# Patient Record
Sex: MALE | Race: WHITE | NOT HISPANIC OR LATINO | Employment: OTHER | URBAN - METROPOLITAN AREA
[De-identification: names, ages, dates, MRNs, and addresses within clinical notes are randomized per-mention and may not be internally consistent; named-entity substitution may affect disease eponyms.]

---

## 2017-06-08 ENCOUNTER — TELEPHONE (OUTPATIENT)
Dept: NEUROLOGY | Facility: CLINIC | Age: 57
End: 2017-06-08

## 2017-06-08 NOTE — TELEPHONE ENCOUNTER
PT WAS INFORMED VIA LETTER THAT GABAPENTIN IS BECOMING CONTROLLED AS OF 7/1/2017. PT WAS INFORMED THAT WE WILL NOT REFILL CONTROLLED MEDICATIONS OVER THE PHONE OR THROUGH PHARMACY REQUESTS ANYMORE. PT UNDERSTANDS THAT THEY MUST MAKE THEIR FOLLOW UP APPT IN ORDER TO CONTINUE TO RECEIVE THEIR CONTROLLED MEDICATION.

## 2021-12-08 ENCOUNTER — APPOINTMENT (OUTPATIENT)
Dept: CT IMAGING | Facility: HOSPITAL | Age: 61
End: 2021-12-08

## 2021-12-08 ENCOUNTER — APPOINTMENT (OUTPATIENT)
Dept: GENERAL RADIOLOGY | Facility: HOSPITAL | Age: 61
End: 2021-12-08

## 2021-12-08 ENCOUNTER — HOSPITAL ENCOUNTER (INPATIENT)
Facility: HOSPITAL | Age: 61
LOS: 2 days | Discharge: HOME | End: 2021-12-10
Attending: EMERGENCY MEDICINE | Admitting: INTERNAL MEDICINE

## 2021-12-08 DIAGNOSIS — Z74.09 IMPAIRED MOBILITY AND ADLS: ICD-10-CM

## 2021-12-08 DIAGNOSIS — A41.9 SEPSIS, DUE TO UNSPECIFIED ORGANISM, UNSPECIFIED WHETHER ACUTE ORGAN DYSFUNCTION PRESENT (HCC): ICD-10-CM

## 2021-12-08 DIAGNOSIS — R07.2 PRECORDIAL PAIN: ICD-10-CM

## 2021-12-08 DIAGNOSIS — Z78.9 IMPAIRED MOBILITY AND ADLS: ICD-10-CM

## 2021-12-08 DIAGNOSIS — R50.9 FEBRILE ILLNESS: Primary | ICD-10-CM

## 2021-12-08 PROBLEM — I25.10 CORONARY ARTERY DISEASE INVOLVING NATIVE CORONARY ARTERY: Status: ACTIVE | Noted: 2021-12-08

## 2021-12-08 PROBLEM — E78.2 MIXED HYPERLIPIDEMIA: Status: ACTIVE | Noted: 2021-12-08

## 2021-12-08 PROBLEM — K90.0 CELIAC DISEASE: Status: ACTIVE | Noted: 2021-12-08

## 2021-12-08 PROBLEM — M79.10 MYALGIA: Status: ACTIVE | Noted: 2021-12-08
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
## 2021-12-08 PROBLEM — S30.860A TICK BITE OF LOWER BACK: Status: ACTIVE | Noted: 2021-12-08

## 2021-12-08 PROBLEM — J41.0 SIMPLE CHRONIC BRONCHITIS (HCC): Status: ACTIVE | Noted: 2021-12-08

## 2021-12-08 PROBLEM — E83.110 HEREDITARY HEMOCHROMATOSIS (HCC): Status: ACTIVE | Noted: 2021-12-08

## 2021-12-08 PROBLEM — I10 PRIMARY HYPERTENSION: Status: ACTIVE | Noted: 2021-12-08

## 2021-12-08 LAB
ALBUMIN SERPL-MCNC: 4.4 G/DL (ref 3.5–5.2)
ALBUMIN/GLOB SERPL: 1.3 G/DL
ALP SERPL-CCNC: 84 U/L (ref 39–117)
ALT SERPL W P-5'-P-CCNC: 17 U/L (ref 1–41)
AMPHET+METHAMPHET UR QL: NEGATIVE
AMPHETAMINES UR QL: NEGATIVE
ANION GAP SERPL CALCULATED.3IONS-SCNC: 11 MMOL/L (ref 5–15)
AST SERPL-CCNC: 21 U/L (ref 1–40)
BARBITURATES UR QL SCN: NEGATIVE
BASOPHILS # BLD AUTO: 0.05 10*3/MM3 (ref 0–0.2)
BASOPHILS NFR BLD AUTO: 0.3 % (ref 0–1.5)
BENZODIAZ UR QL SCN: NEGATIVE
BILIRUB SERPL-MCNC: 2.3 MG/DL (ref 0–1.2)
BILIRUB UR QL STRIP: NEGATIVE
BUN SERPL-MCNC: 19 MG/DL (ref 6–20)
BUN/CREAT SERPL: 17.4 (ref 7–25)
BUPRENORPHINE SERPL-MCNC: NEGATIVE NG/ML
CALCIUM SPEC-SCNC: 9.3 MG/DL (ref 8.6–10.5)
CANNABINOIDS SERPL QL: NEGATIVE
CHLORIDE SERPL-SCNC: 97 MMOL/L (ref 98–107)
CHOLEST SERPL-MCNC: 123 MG/DL (ref 0–200)
CLARITY UR: CLEAR
CO2 SERPL-SCNC: 24 MMOL/L (ref 22–29)
COCAINE UR QL: NEGATIVE
COLOR UR: YELLOW
CREAT SERPL-MCNC: 1.09 MG/DL (ref 0.76–1.27)
CRP SERPL-MCNC: 0.73 MG/DL (ref 0–0.5)
D-LACTATE SERPL-SCNC: 1.7 MMOL/L (ref 0.5–2)
D-LACTATE SERPL-SCNC: 2.7 MMOL/L (ref 0.5–2)
DEPRECATED RDW RBC AUTO: 41.3 FL (ref 37–54)
EOSINOPHIL # BLD AUTO: 0.09 10*3/MM3 (ref 0–0.4)
EOSINOPHIL NFR BLD AUTO: 0.6 % (ref 0.3–6.2)
ERYTHROCYTE [DISTWIDTH] IN BLOOD BY AUTOMATED COUNT: 14.1 % (ref 12.3–15.4)
FLUAV SUBTYP SPEC NAA+PROBE: NOT DETECTED
FLUBV RNA ISLT QL NAA+PROBE: NOT DETECTED
GFR SERPL CREATININE-BSD FRML MDRD: 69 ML/MIN/1.73
GLOBULIN UR ELPH-MCNC: 3.4 GM/DL
GLUCOSE SERPL-MCNC: 177 MG/DL (ref 65–99)
GLUCOSE UR STRIP-MCNC: NEGATIVE MG/DL
HBA1C MFR BLD: 6.3 % (ref 4.8–5.6)
HCT VFR BLD AUTO: 41.7 % (ref 37.5–51)
HDLC SERPL-MCNC: 49 MG/DL (ref 40–60)
HGB BLD-MCNC: 14.6 G/DL (ref 13–17.7)
HGB UR QL STRIP.AUTO: NEGATIVE
HOLD SPECIMEN: NORMAL
IMM GRANULOCYTES # BLD AUTO: 0.06 10*3/MM3 (ref 0–0.05)
IMM GRANULOCYTES NFR BLD AUTO: 0.4 % (ref 0–0.5)
KETONES UR QL STRIP: NEGATIVE
LDLC SERPL CALC-MCNC: 53 MG/DL (ref 0–100)
LDLC/HDLC SERPL: 1.04 {RATIO}
LEUKOCYTE ESTERASE UR QL STRIP.AUTO: NEGATIVE
LIPASE SERPL-CCNC: 42 U/L (ref 13–60)
LYMPHOCYTES # BLD AUTO: 0.6 10*3/MM3 (ref 0.7–3.1)
LYMPHOCYTES NFR BLD AUTO: 3.8 % (ref 19.6–45.3)
MCH RBC QN AUTO: 28.5 PG (ref 26.6–33)
MCHC RBC AUTO-ENTMCNC: 35 G/DL (ref 31.5–35.7)
MCV RBC AUTO: 81.4 FL (ref 79–97)
METHADONE UR QL SCN: NEGATIVE
MONOCYTES # BLD AUTO: 0.52 10*3/MM3 (ref 0.1–0.9)
MONOCYTES NFR BLD AUTO: 3.3 % (ref 5–12)
NEUTROPHILS NFR BLD AUTO: 14.55 10*3/MM3 (ref 1.7–7)
NEUTROPHILS NFR BLD AUTO: 91.6 % (ref 42.7–76)
NITRITE UR QL STRIP: NEGATIVE
NRBC BLD AUTO-RTO: 0 /100 WBC (ref 0–0.2)
NT-PROBNP SERPL-MCNC: 31.7 PG/ML (ref 0–900)
OPIATES UR QL: NEGATIVE
OXYCODONE UR QL SCN: NEGATIVE
PCP UR QL SCN: NEGATIVE
PH UR STRIP.AUTO: 7.5 [PH] (ref 5–8)
PLATELET # BLD AUTO: 144 10*3/MM3 (ref 140–450)
PMV BLD AUTO: 10 FL (ref 6–12)
POTASSIUM SERPL-SCNC: 4.3 MMOL/L (ref 3.5–5.2)
PROCALCITONIN SERPL-MCNC: 0.43 NG/ML (ref 0–0.25)
PROPOXYPH UR QL: NEGATIVE
PROT SERPL-MCNC: 7.8 G/DL (ref 6–8.5)
PROT UR QL STRIP: NEGATIVE
QT INTERVAL: 336 MS
QT INTERVAL: 350 MS
QTC INTERVAL: 437 MS
QTC INTERVAL: 456 MS
RBC # BLD AUTO: 5.12 10*6/MM3 (ref 4.14–5.8)
SARS-COV-2 RNA PNL SPEC NAA+PROBE: NOT DETECTED
SODIUM SERPL-SCNC: 132 MMOL/L (ref 136–145)
SP GR UR STRIP: 1.04 (ref 1–1.03)
TRICYCLICS UR QL SCN: NEGATIVE
TRIGL SERPL-MCNC: 115 MG/DL (ref 0–150)
TROPONIN T SERPL-MCNC: <0.01 NG/ML (ref 0–0.03)
TROPONIN T SERPL-MCNC: <0.01 NG/ML (ref 0–0.03)
UROBILINOGEN UR QL STRIP: ABNORMAL
VLDLC SERPL-MCNC: 21 MG/DL (ref 5–40)
WBC NRBC COR # BLD: 15.87 10*3/MM3 (ref 3.4–10.8)
WHOLE BLOOD HOLD SPECIMEN: NORMAL
WHOLE BLOOD HOLD SPECIMEN: NORMAL

## 2021-12-08 PROCEDURE — 0 IOPAMIDOL PER 1 ML: Performed by: EMERGENCY MEDICINE

## 2021-12-08 PROCEDURE — 86666 EHRLICHIA ANTIBODY: CPT | Performed by: NURSE PRACTITIONER

## 2021-12-08 PROCEDURE — 93005 ELECTROCARDIOGRAM TRACING: CPT | Performed by: EMERGENCY MEDICINE

## 2021-12-08 PROCEDURE — 87040 BLOOD CULTURE FOR BACTERIA: CPT | Performed by: EMERGENCY MEDICINE

## 2021-12-08 PROCEDURE — 99285 EMERGENCY DEPT VISIT HI MDM: CPT

## 2021-12-08 PROCEDURE — 83605 ASSAY OF LACTIC ACID: CPT | Performed by: EMERGENCY MEDICINE

## 2021-12-08 PROCEDURE — 86757 RICKETTSIA ANTIBODY: CPT | Performed by: NURSE PRACTITIONER

## 2021-12-08 PROCEDURE — 81003 URINALYSIS AUTO W/O SCOPE: CPT | Performed by: EMERGENCY MEDICINE

## 2021-12-08 PROCEDURE — 80053 COMPREHEN METABOLIC PANEL: CPT | Performed by: EMERGENCY MEDICINE

## 2021-12-08 PROCEDURE — 80061 LIPID PANEL: CPT | Performed by: NURSE PRACTITIONER

## 2021-12-08 PROCEDURE — 83880 ASSAY OF NATRIURETIC PEPTIDE: CPT | Performed by: EMERGENCY MEDICINE

## 2021-12-08 PROCEDURE — 71275 CT ANGIOGRAPHY CHEST: CPT

## 2021-12-08 PROCEDURE — 25010000002 ONDANSETRON PER 1 MG: Performed by: NURSE PRACTITIONER

## 2021-12-08 PROCEDURE — 80306 DRUG TEST PRSMV INSTRMNT: CPT | Performed by: NURSE PRACTITIONER

## 2021-12-08 PROCEDURE — 25010000002 ONDANSETRON PER 1 MG: Performed by: EMERGENCY MEDICINE

## 2021-12-08 PROCEDURE — 25010000002 PIPERACILLIN SOD-TAZOBACTAM PER 1 G: Performed by: EMERGENCY MEDICINE

## 2021-12-08 PROCEDURE — 86618 LYME DISEASE ANTIBODY: CPT | Performed by: NURSE PRACTITIONER

## 2021-12-08 PROCEDURE — 84484 ASSAY OF TROPONIN QUANT: CPT | Performed by: EMERGENCY MEDICINE

## 2021-12-08 PROCEDURE — 74176 CT ABD & PELVIS W/O CONTRAST: CPT

## 2021-12-08 PROCEDURE — 86140 C-REACTIVE PROTEIN: CPT | Performed by: NURSE PRACTITIONER

## 2021-12-08 PROCEDURE — 25010000002 VANCOMYCIN 10 G RECONSTITUTED SOLUTION: Performed by: EMERGENCY MEDICINE

## 2021-12-08 PROCEDURE — 93005 ELECTROCARDIOGRAM TRACING: CPT

## 2021-12-08 PROCEDURE — 83036 HEMOGLOBIN GLYCOSYLATED A1C: CPT | Performed by: NURSE PRACTITIONER

## 2021-12-08 PROCEDURE — 71045 X-RAY EXAM CHEST 1 VIEW: CPT

## 2021-12-08 PROCEDURE — 85025 COMPLETE CBC W/AUTO DIFF WBC: CPT

## 2021-12-08 PROCEDURE — 99223 1ST HOSP IP/OBS HIGH 75: CPT | Performed by: HOSPITALIST

## 2021-12-08 PROCEDURE — 83690 ASSAY OF LIPASE: CPT | Performed by: EMERGENCY MEDICINE

## 2021-12-08 PROCEDURE — 84145 PROCALCITONIN (PCT): CPT | Performed by: EMERGENCY MEDICINE

## 2021-12-08 PROCEDURE — 87636 SARSCOV2 & INF A&B AMP PRB: CPT | Performed by: EMERGENCY MEDICINE

## 2021-12-08 PROCEDURE — 94799 UNLISTED PULMONARY SVC/PX: CPT

## 2021-12-08 PROCEDURE — 25010000002 HEPARIN (PORCINE) PER 1000 UNITS: Performed by: NURSE PRACTITIONER

## 2021-12-08 PROCEDURE — 86753 PROTOZOA ANTIBODY NOS: CPT | Performed by: NURSE PRACTITIONER

## 2021-12-08 RX ORDER — ACETAMINOPHEN 325 MG/1
650 TABLET ORAL EVERY 4 HOURS PRN
Status: DISCONTINUED | OUTPATIENT
Start: 2021-12-08 | End: 2021-12-10 | Stop reason: HOSPADM

## 2021-12-08 RX ORDER — RANOLAZINE 500 MG/1
500 TABLET, EXTENDED RELEASE ORAL 2 TIMES DAILY
COMMUNITY

## 2021-12-08 RX ORDER — ALBUTEROL SULFATE 90 UG/1
2 AEROSOL, METERED RESPIRATORY (INHALATION) EVERY 4 HOURS PRN
COMMUNITY

## 2021-12-08 RX ORDER — ASPIRIN 81 MG/1
81 TABLET, CHEWABLE ORAL DAILY
COMMUNITY

## 2021-12-08 RX ORDER — ALBUTEROL SULFATE 90 UG/1
2 AEROSOL, METERED RESPIRATORY (INHALATION) ONCE
Status: COMPLETED | OUTPATIENT
Start: 2021-12-08 | End: 2021-12-08

## 2021-12-08 RX ORDER — VANCOMYCIN HYDROCHLORIDE 1 G/200ML
1000 INJECTION, SOLUTION INTRAVENOUS EVERY 12 HOURS
Status: DISCONTINUED | OUTPATIENT
Start: 2021-12-09 | End: 2021-12-10

## 2021-12-08 RX ORDER — CHOLECALCIFEROL (VITAMIN D3) 125 MCG
5 CAPSULE ORAL NIGHTLY PRN
Status: DISCONTINUED | OUTPATIENT
Start: 2021-12-08 | End: 2021-12-10 | Stop reason: HOSPADM

## 2021-12-08 RX ORDER — DOXYCYCLINE 100 MG/1
100 CAPSULE ORAL EVERY 12 HOURS SCHEDULED
Status: DISCONTINUED | OUTPATIENT
Start: 2021-12-08 | End: 2021-12-10 | Stop reason: HOSPADM

## 2021-12-08 RX ORDER — SODIUM CHLORIDE 0.9 % (FLUSH) 0.9 %
10 SYRINGE (ML) INJECTION EVERY 12 HOURS SCHEDULED
Status: DISCONTINUED | OUTPATIENT
Start: 2021-12-08 | End: 2021-12-10 | Stop reason: HOSPADM

## 2021-12-08 RX ORDER — ACETAMINOPHEN 160 MG/5ML
650 SOLUTION ORAL EVERY 4 HOURS PRN
Status: DISCONTINUED | OUTPATIENT
Start: 2021-12-08 | End: 2021-12-10 | Stop reason: HOSPADM

## 2021-12-08 RX ORDER — SODIUM CHLORIDE 9 MG/ML
100 INJECTION, SOLUTION INTRAVENOUS CONTINUOUS
Status: ACTIVE | OUTPATIENT
Start: 2021-12-08 | End: 2021-12-09

## 2021-12-08 RX ORDER — PANTOPRAZOLE SODIUM 40 MG/1
40 TABLET, DELAYED RELEASE ORAL
Status: DISCONTINUED | OUTPATIENT
Start: 2021-12-09 | End: 2021-12-10 | Stop reason: HOSPADM

## 2021-12-08 RX ORDER — HEPARIN SODIUM 5000 [USP'U]/ML
5000 INJECTION, SOLUTION INTRAVENOUS; SUBCUTANEOUS EVERY 8 HOURS SCHEDULED
Status: DISCONTINUED | OUTPATIENT
Start: 2021-12-08 | End: 2021-12-10 | Stop reason: HOSPADM

## 2021-12-08 RX ORDER — IPRATROPIUM BROMIDE AND ALBUTEROL SULFATE 2.5; .5 MG/3ML; MG/3ML
3 SOLUTION RESPIRATORY (INHALATION) EVERY 4 HOURS PRN
Status: DISCONTINUED | OUTPATIENT
Start: 2021-12-08 | End: 2021-12-10 | Stop reason: HOSPADM

## 2021-12-08 RX ORDER — AMOXICILLIN 250 MG
2 CAPSULE ORAL 2 TIMES DAILY
Status: DISCONTINUED | OUTPATIENT
Start: 2021-12-08 | End: 2021-12-10 | Stop reason: HOSPADM

## 2021-12-08 RX ORDER — POLYETHYLENE GLYCOL 3350 17 G/17G
17 POWDER, FOR SOLUTION ORAL DAILY PRN
Status: DISCONTINUED | OUTPATIENT
Start: 2021-12-08 | End: 2021-12-10 | Stop reason: HOSPADM

## 2021-12-08 RX ORDER — SODIUM CHLORIDE 0.9 % (FLUSH) 0.9 %
10 SYRINGE (ML) INJECTION AS NEEDED
Status: DISCONTINUED | OUTPATIENT
Start: 2021-12-08 | End: 2021-12-10 | Stop reason: HOSPADM

## 2021-12-08 RX ORDER — CLOPIDOGREL BISULFATE 75 MG/1
75 TABLET ORAL DAILY
COMMUNITY

## 2021-12-08 RX ORDER — BISACODYL 5 MG/1
5 TABLET, DELAYED RELEASE ORAL DAILY PRN
Status: DISCONTINUED | OUTPATIENT
Start: 2021-12-08 | End: 2021-12-10 | Stop reason: HOSPADM

## 2021-12-08 RX ORDER — LISINOPRIL 5 MG/1
5 TABLET ORAL
Status: DISCONTINUED | OUTPATIENT
Start: 2021-12-09 | End: 2021-12-10 | Stop reason: HOSPADM

## 2021-12-08 RX ORDER — ONDANSETRON 2 MG/ML
4 INJECTION INTRAMUSCULAR; INTRAVENOUS EVERY 6 HOURS PRN
Status: DISCONTINUED | OUTPATIENT
Start: 2021-12-08 | End: 2021-12-10 | Stop reason: HOSPADM

## 2021-12-08 RX ORDER — ACETAMINOPHEN 650 MG/1
650 SUPPOSITORY RECTAL EVERY 4 HOURS PRN
Status: DISCONTINUED | OUTPATIENT
Start: 2021-12-08 | End: 2021-12-10 | Stop reason: HOSPADM

## 2021-12-08 RX ORDER — ROSUVASTATIN CALCIUM 10 MG/1
40 TABLET, COATED ORAL DAILY
COMMUNITY

## 2021-12-08 RX ORDER — BISACODYL 10 MG
10 SUPPOSITORY, RECTAL RECTAL DAILY PRN
Status: DISCONTINUED | OUTPATIENT
Start: 2021-12-08 | End: 2021-12-10 | Stop reason: HOSPADM

## 2021-12-08 RX ORDER — ONDANSETRON 2 MG/ML
4 INJECTION INTRAMUSCULAR; INTRAVENOUS ONCE
Status: COMPLETED | OUTPATIENT
Start: 2021-12-08 | End: 2021-12-08

## 2021-12-08 RX ADMIN — SODIUM CHLORIDE 100 ML/HR: 9 INJECTION, SOLUTION INTRAVENOUS at 23:50

## 2021-12-08 RX ADMIN — SODIUM CHLORIDE 1000 ML: 9 INJECTION, SOLUTION INTRAVENOUS at 14:41

## 2021-12-08 RX ADMIN — SODIUM CHLORIDE, PRESERVATIVE FREE 10 ML: 5 INJECTION INTRAVENOUS at 23:50

## 2021-12-08 RX ADMIN — TAZOBACTAM SODIUM AND PIPERACILLIN SODIUM 4.5 G: 500; 4 INJECTION, SOLUTION INTRAVENOUS at 17:07

## 2021-12-08 RX ADMIN — DOCUSATE SODIUM 50 MG AND SENNOSIDES 8.6 MG 2 TABLET: 8.6; 5 TABLET, FILM COATED ORAL at 23:49

## 2021-12-08 RX ADMIN — ONDANSETRON 4 MG: 2 INJECTION INTRAMUSCULAR; INTRAVENOUS at 23:49

## 2021-12-08 RX ADMIN — IOPAMIDOL 80 ML: 755 INJECTION, SOLUTION INTRAVENOUS at 14:29

## 2021-12-08 RX ADMIN — Medication 5 MG: at 23:49

## 2021-12-08 RX ADMIN — ACETAMINOPHEN 650 MG: 325 TABLET, FILM COATED ORAL at 23:50

## 2021-12-08 RX ADMIN — DOXYCYCLINE 100 MG: 100 CAPSULE ORAL at 19:09

## 2021-12-08 RX ADMIN — ALBUTEROL SULFATE 2 PUFF: 90 AEROSOL, METERED RESPIRATORY (INHALATION) at 14:08

## 2021-12-08 RX ADMIN — HEPARIN SODIUM 5000 UNITS: 5000 INJECTION INTRAVENOUS; SUBCUTANEOUS at 23:49

## 2021-12-08 RX ADMIN — ONDANSETRON 4 MG: 2 INJECTION INTRAMUSCULAR; INTRAVENOUS at 14:41

## 2021-12-08 RX ADMIN — SODIUM CHLORIDE 1000 ML: 9 INJECTION, SOLUTION INTRAVENOUS at 16:57

## 2021-12-08 RX ADMIN — VANCOMYCIN HYDROCHLORIDE 2000 MG: 10 INJECTION, POWDER, LYOPHILIZED, FOR SOLUTION INTRAVENOUS at 17:43

## 2021-12-08 NOTE — H&P
[facility] Medicine Services  HISTORY AND PHYSICAL    Patient Name: Kobi Gonzalez  : 1960  MRN: [id number]  Primary Care Physician: Leila Kolb, OXANA  Date of admission: 2021      Subjective   Subjective     Chief Complaint:  Fever, arthralgia, chest pain, shortness of breath    HPI:  Kobi Gonzalez is a 60 y.o. male PMH MI with 1 stent (), COPD, hx of smoking (quit ), HTN, GERD, chronic low back pain, celiac disease, hemochromatosis, HLD presenting with fever, arthralgia, chest pain, shortness of breath that has been intermittent since . Pt reports tick bite in May but work up was negative for Lyme's disease. He reports pneumonia in July (received oral antibiotics without improvement) and COVID in August. Pt states he continues to have intermittent episodes of fever, headache, dizziness, nausea, vomiting,  abd pain, rash, diarrhea, joint pain with swelling, shortness of breath, coughing. Denies decreased appetite and he is unsure if he has had any weight loss. Pt follows with cardiologist Dr Gamaliel Centeno in [facility]. Pt had heart cath last week at [facility] that was negative. He reports colonoscopy 2 years ago that was normal.     Review of Systems   Constitutional: Positive for activity change, fatigue and fever.   Eyes: Negative.    Respiratory: Positive for cough and shortness of breath.    Cardiovascular: Positive for chest pain and leg swelling.   Gastrointestinal: Positive for abdominal pain, diarrhea, nausea and vomiting.   Endocrine: Negative.    Genitourinary: Positive for flank pain. Negative for dysuria.   Musculoskeletal: Positive for arthralgias, back pain, joint swelling and myalgias. Negative for neck pain and neck stiffness.   Skin: Positive for rash.   Allergic/Immunologic: Negative.    Neurological: Positive for dizziness, weakness and headaches.   Hematological: Negative.    Psychiatric/Behavioral: Negative.       All other systems reviewed and are negative.     Personal History     Past Medical History:   Diagnosis Date   • Celiac disease    • GERD (gastroesophageal reflux disease)    • Hemochromatosis    • Hyperlipidemia    • Hypertension    • MI (myocardial infarction) (HCC) 2018    1 STENT       Past Surgical History:   Procedure Laterality Date   • NO PAST SURGERIES         Family History: family history includes Heart disease in his father and mother; Hypertension in his father and mother. Otherwise pertinent FHx was reviewed and unremarkable.     Social History:  reports that he quit smoking about 17 years ago. He does not have any smokeless tobacco history on file. He reports that he does not drink alcohol and does not use drugs.  Social History     Social History Narrative   • Not on file       Medications:    Available home medication information reviewed.  (Not in a hospital admission)      No Known Allergies    Objective   Objective     Vital Signs:   Temp:  [101.1 °F (38.4 °C)] 101.1 °F (38.4 °C)  Heart Rate:  [] 97  Resp:  [20] 20  BP: (128-146)/(76-88) 131/78        Physical Exam  Constitutional:       Appearance: He is obese. He is ill-appearing. He is not toxic-appearing.   HENT:      Head: Normocephalic and atraumatic.      Nose: Nose normal.   Eyes:      Conjunctiva/sclera: Conjunctivae normal.      Pupils: Pupils are equal, round, and reactive to light.   Cardiovascular:      Rate and Rhythm: Regular rhythm. Tachycardia present.      Pulses: Normal pulses.      Heart sounds: Normal heart sounds.   Pulmonary:      Effort: Respiratory distress present.      Breath sounds: Rhonchi and rales present. No wheezing.      Comments: Tachypneic  Chest:      Chest wall: No tenderness.   Abdominal:      General: There is distension.      Tenderness: There is abdominal tenderness. There is no guarding or rebound.   Musculoskeletal:         General: Swelling present.      Cervical back: Normal range of motion  and neck supple. No rigidity.   Skin:     General: Skin is warm and dry.   Neurological:      General: No focal deficit present.      Mental Status: He is alert and oriented to person, place, and time.      Cranial Nerves: No cranial nerve deficit.      Motor: Weakness present.   Psychiatric:         Mood and Affect: Mood normal.         Behavior: Behavior normal.         Thought Content: Thought content normal.         Judgment: Judgment normal.        Results Reviewed:  I have personally reviewed current lab and radiology data.    Results from last 7 days   Lab Units 12/08/21  1318   WBC 10*3/mm3 15.87*   HEMOGLOBIN g/dL 14.6   HEMATOCRIT % 41.7   PLATELETS 10*3/mm3 144     Results from last 7 days   Lab Units 12/08/21  1627 12/08/21  1522 12/08/21  1318 12/08/21  1318   SODIUM mmol/L  --   --   --  132*   POTASSIUM mmol/L  --   --   --  4.3   CHLORIDE mmol/L  --   --   --  97*   CO2 mmol/L  --   --   --  24.0   BUN mg/dL  --   --   --  19   CREATININE mg/dL  --   --   --  1.09   GLUCOSE mg/dL  --   --   --  177*   CALCIUM mg/dL  --   --   --  9.3   ALT (SGPT) U/L  --   --   --  17   AST (SGOT) U/L  --   --   --  21   TROPONIN T ng/mL  --  <0.010  --  <0.010   PROBNP pg/mL  --   --   --  31.7   LACTATE mmol/L 1.7  --    < > 2.7*   PROCALCITONIN ng/mL  --   --   --  0.43*    < > = values in this interval not displayed.     Estimated Creatinine Clearance: 88.1 mL/min (by C-G formula based on SCr of 1.09 mg/dL).  Brief Urine Lab Results  (Last result in the past 365 days)      Color   Clarity   Blood   Leuk Est   Nitrite   Protein   CREAT   Urine HCG        12/08/21 1522 Yellow   Clear   Negative   Negative   Negative   Negative               Imaging Results (Last 24 Hours)     Procedure Component Value Units Date/Time    CT Abdomen Pelvis Without Contrast [[id number]] Resulted: 12/08/21 1802     Updated: 12/08/21 1806    CT Chest Pulmonary Embolism [[id number]] Collected: 12/08/21 1433     Updated: 12/08/21 1438     Narrative:      EXAMINATION: CT CHEST PULMONARY EMBOLISM-12/08/2021:      INDICATION: PE suspected, high prob, shortness of breath.     TECHNIQUE: Multiple axial CT imaging was obtained of the chest following  the administration of intravenous contrast.     The radiation dose reduction device was turned on for each scan per the  ALARA (As Low as Reasonably Achievable) protocol.     COMPARISON: NONE.     FINDINGS: The thyroid is homogeneous. No mediastinal mass or adenopathy.  The cardiac chambers are within normal limits. No pericardial effusion.  No bulky hilar or axillary lymphadenopathy. No filling defect in the  segmental or subsegmental arteries. Degenerative changes seen within the  spine. The visualized upper abdomen reveals some chronic changes and  scarring in the lung bases bilaterally. No superimposed infiltrate. No  pulmonary mass or nodule. Degenerative changes seen within the spine.       Impression:      No evidence of pulmonary embolism. No CT evidence of acute  intrathoracic abnormality. No superimposed infectious process.     D:  12/08/2021  E:  12/08/2021          XR Chest 1 View [[id number]] Collected: 12/08/21 1353     Updated: 12/08/21 1354    Narrative:      EXAMINATION: XR CHEST 1 VW-12/08/2021:      INDICATION: Chest Pain, triage protocol.      COMPARISON: NONE.     FINDINGS: Portable chest reveals cardiac and mediastinal silhouettes  within normal limits. The lung fields are clear. No focal parenchymal  opacification present.  No pleural effusion or pneumothorax. The bony  structures are unremarkable.       Impression:      No acute cardiopulmonary disease.     D:  12/08/2021  E:  12/08/2021                  Assessment/Plan   Assessment / Plan     Active Hospital Problems    Diagnosis  POA   • Sepsis, unspecified organism (HCC) [A41.9]  Unknown   • Tick bite of lower back [S30.860A, W57.XXXA]  Unknown   • Fever [R50.9]  Unknown   • Myalgia [M79.10]  Unknown   • Celiac disease [K90.0]   Unknown   • Hereditary hemochromatosis (HCC) [E83.110]  Unknown   • Coronary artery disease involving native coronary artery [I25.10]  Unknown   • Primary hypertension [I10]  Unknown   • Mixed hyperlipidemia [E78.2]  Unknown   • Simple chronic bronchitis (HCC) [J41.0]  Unknown     Hospital Course:  Kobi Gonzalez is a 60 y.o. male PMH MI with 1 stent (2018), COPD, hx of smoking (quit 2003), HTN, GERD, chronic low back pain, celiac disease, hemochromatosis, HLD presenting with fever, arthralgia, chest pain, shortness of breath that has been intermittent since June.     Sepsis unknown origin  -Fluid Resuscitation  -Lactic acid on arrival 2.7, repeat 1.7  -elevated procal 0.43, WBC 15.87  -blood cultures, GI PCR, respiratory PCR  -Tick borne panel   -zosyn, vanco, doxy  -MRSA PCR  -COVID/Influenza neg   -NS 100ml/hr overnight  -CTA chest No PE or acute abnormality   -Cxray no acute issues  -CTabd/pelvis pending    Chest pain(appears pleuritic)  Hx MI with 1 stent (2018)  -troponin neg   -BNP normal  -ECHO  -EKG Sinus Tach Qt/Qtc 350/456    Elevated Glucose  -A1c 6.3    Hyponatremia  -Na+ 132, corrected 133     HTN/HLD  -Lisinopril    Hyperbilirubinemia  -total bili 2.3  -?Gilbert's syndrome    Obesity  -BMI 33.00    Hx Hemochromatosis    Celiac's disease  -Celiac diet    GERD  -Protonix    Chronic low back pain  -gabapentin on home med list but juan report #[id number] show no controlled substances.     DVT prophylaxis:  Heparin     CODE STATUS:    Code Status and Medical Interventions:   Ordered at: 12/08/21 5640     Level Of Support Discussed With:    Patient     Code Status (Patient has no pulse and is not breathing):    CPR (Attempt to Resuscitate)     Medical Interventions (Patient has pulse or is breathing):    Full Support     Admission Status:  I believe this patient meets INPATIENT status due to Sepsis POA with unclear source.  I feel patient’s risk for adverse outcomes and need for care warrant INPATIENT  evaluation and I predict the patient’s care encounter to likely last beyond 2 midnights.    Electronically signed by OXANA Durant, 12/08/21, 5:19 PM EST.      Attending   Admission Attestation       I have seen and examined the patient, performing an independent face-to-face diagnostic evaluation with plan of care reviewed and developed with the advanced practice clinician (APC).      Brief Summary Statement:   Kobi Gonzalez is a 60 y.o. male history of GERD, hemochromatosis, hyperlipidemia, hypertension, celiac disease, coronary artery disease with 1 stent, COPD, former smoker, hypertension, low back pain who presented to the ER with shortness of breath, chest pain, fevers, cough, chest congestion.    He has had chest pains for several months maybe even going back to the summer 2021    Precordial pain has been there since July 2021.  He also appears to meet sepsis criteria present on admission with unclear source of infection.  Temperature of 101.1 CT of the chest was done in the ER.  His T-max today was 103.8.  Covid and influenza testing was negative.  CT of the abdomen pelvis was done due to the fact that we do not have a localizing source of infection.  Cholelithiasis on CT.  Elevated Bilirubin.   US Gallbladder pending    Remainder of detailed HPI is as noted by APC and has been reviewed and/or edited by me for completeness.    Attending Physical Exam:    Constitutional: Awake, alert  Eyes: PERRLA, sclerae anicteric, no conjunctival injection  HENT: NCAT, mucous membranes moist  Neck: Supple, no JVD, trachea midline  Respiratory: Clear to auscultation bilaterally, nonlabored respirations   Cardiovascular: RRR, no murmurs, rubs, or gallops, palpable pedal pulses bilaterally  Gastrointestinal: Positive bowel sounds, soft, nontender, obese abdomen  Musculoskeletal: No bilateral ankle edema, no clubbing or cyanosis to extremities  Psychiatric: Appropriate affect, cooperative  Neurologic: Oriented x 3,  strength symmetric in all extremities, Cranial Nerves grossly intact to confrontation, speech clear  Skin: dry, + skin, bronzed skin of suntan in face and neck    Brief Assessment/Plan :  See detailed assessment and plan developed with APC which I have reviewed and/or edited for completeness.    Admission Status: I believe that this patient meets INPATIENT status due to Sepsis POA with unclear source of infection.  I feel patient’s risk for adverse outcomes and need for care warrant INPATIENT evaluation and I predict the patient’s care encounter to likely last beyond 2 midnights.        Robb James MD  12/08/21

## 2021-12-08 NOTE — ED PROVIDER NOTES
Subjective   60-year-old male presents to the emergency department with cough congestion fever chest pain and shortness of breath    Patient reports a history of chest pain for the last 2 months.  He had a heart cath within the last week that was negative [facility].  They gave him a diagnosis however he cannot remember what that was.  His chest pain is continued and he reports pain that was 8 or 9/10 of substernal chest pain worse with movement and breathing.  He was exposed to coronavirus recently with onset of fever today.  He has had cough congestion and increased shortness of breath today as well.  His cough is productive of white thick sputum but just a small amount.  He does have pleuritic pain.  He denies any radiation of the pain and is primarily at the left inferior parasternal area and left anterior chest.  It is worse with movement.  He denies any lower extremity edema.  He has had no history of DVT.  He denies any leg redness    Denies any abdominal pain nausea vomiting diarrhea or melena but occasionally has some flecks of blood in his stool.  He denies any dysuria frequency urgency or hematuria.          Review of Systems   Constitutional: Positive for chills and fever.   HENT: Positive for congestion. Negative for sore throat.    Eyes: Negative.    Respiratory: Positive for cough and shortness of breath.    Cardiovascular: Positive for chest pain. Negative for palpitations and leg swelling.   Gastrointestinal: Negative.  Negative for abdominal pain, diarrhea, nausea and vomiting.   Genitourinary: Negative.  Negative for dysuria.   Skin: Negative.    Neurological: Negative.    All other systems reviewed and are negative.      Past Medical History:   Diagnosis Date   • Celiac disease    • GERD (gastroesophageal reflux disease)    • Hemochromatosis    • Hyperlipidemia    • Hypertension        No Known Allergies    Past Surgical History:   Procedure Laterality Date   • NO PAST SURGERIES         Family  History   Problem Relation Age of Onset   • Heart disease Mother    • Hypertension Mother    • Heart disease Father    • Hypertension Father        Social History     Socioeconomic History   • Marital status:    Tobacco Use   • Smoking status: Never Smoker   Substance and Sexual Activity   • Alcohol use: No   • Drug use: No   • Sexual activity: Defer           Objective   Physical Exam  Vitals and nursing note reviewed.   Constitutional:       General: He is not in acute distress.     Appearance: He is ill-appearing.   HENT:      Head: Normocephalic and atraumatic.      Nose: Nose normal.   Eyes:      Conjunctiva/sclera: Conjunctivae normal.   Cardiovascular:      Rate and Rhythm: Normal rate and regular rhythm.      Heart sounds: Normal heart sounds.      Comments: Chest wall of left parasternal tenderness at the inferior aspect that is exquisite and reproduces his symptoms  Pulmonary:      Effort: Tachypnea and respiratory distress present.      Breath sounds: Examination of the right-middle field reveals rales. Examination of the left-middle field reveals rales. Examination of the right-lower field reveals rales. Examination of the left-lower field reveals rales. Decreased breath sounds, rhonchi and rales present. No wheezing.   Chest:      Chest wall: No tenderness.   Abdominal:      General: Bowel sounds are normal. There is no distension.      Palpations: Abdomen is soft.      Tenderness: There is no abdominal tenderness.   Musculoskeletal:         General: Normal range of motion.      Cervical back: Normal range of motion.      Right lower leg: No tenderness. No edema.      Left lower leg: No tenderness. No edema.      Comments: No stigmata of DVT   Skin:     General: Skin is warm and dry.   Neurological:      General: No focal deficit present.      Mental Status: He is alert and oriented to person, place, and time.         Procedures           ED Course  ED Course as of 12/08/21 1654   Wed Dec 08,  2021 1340 Patient presents with an O2 sat of 92%.  He is on oxygen with improvement of his saturation.  Temp is 101.1.  He has Covid exposure but is fully immunized against Covid and had Covid in August.  Reswabbing him again.  I discussed evaluation.  We will CT his chest as well.  Patient is in agreement [HH]   1456 Patient is serially reevaluated throughout the ED course with last reevaluation now.  His pressures remained adequate.  His chest pain is improved.  His T-max was 103.8 today.  His lactate is 2.7 with white count of 15.8 and procalcitonin of 0.43.  Covid influenza testing are negative.The patient's received a liter fluid with a second liter pending.  I treated him with Zosyn based on his sepsis parameters but I have no clear etiology of the symptoms.In further discussion he reports recurrent intermittent fever since June, but again reports that yesterday he was completely asymptomatic [HH]   1652 Discussed the findings with Dr. James who will admit for definitive inpatient careI reevaluated the patient and he was complaining of left flank pain.  He has a clinical picture of sepsis without a source.  He is treated with Zosyn and ordered vancomycin as well.  We will proceed with a CT as well for localization of the source [HH]      ED Course User Index  [] Jonh Martinez MD                                                 [facility]    Final diagnoses:   Febrile illness   Sepsis, due to unspecified organism, unspecified whether acute organ dysfunction present (HCC)   Precordial pain       ED Disposition  ED Disposition     ED Disposition Condition Comment    Decision to Admit            No follow-up provider specified.       Medication List      No changes were made to your prescriptions during this visit.          Jonh Martinez MD  12/08/21 2226

## 2021-12-09 ENCOUNTER — APPOINTMENT (OUTPATIENT)
Dept: ULTRASOUND IMAGING | Facility: HOSPITAL | Age: 61
End: 2021-12-09

## 2021-12-09 ENCOUNTER — APPOINTMENT (OUTPATIENT)
Dept: CARDIOLOGY | Facility: HOSPITAL | Age: 61
End: 2021-12-09

## 2021-12-09 LAB
ADV 40+41 DNA STL QL NAA+NON-PROBE: NOT DETECTED
ALBUMIN SERPL-MCNC: 3.4 G/DL (ref 3.5–5.2)
ALBUMIN/GLOB SERPL: 1.3 G/DL
ALP SERPL-CCNC: 67 U/L (ref 39–117)
ALT SERPL W P-5'-P-CCNC: 12 U/L (ref 1–41)
ANION GAP SERPL CALCULATED.3IONS-SCNC: 5 MMOL/L (ref 5–15)
AST SERPL-CCNC: 14 U/L (ref 1–40)
ASTRO TYP 1-8 RNA STL QL NAA+NON-PROBE: NOT DETECTED
B PARAPERT DNA SPEC QL NAA+PROBE: NOT DETECTED
B PERT DNA SPEC QL NAA+PROBE: NOT DETECTED
BASOPHILS # BLD AUTO: 0.04 10*3/MM3 (ref 0–0.2)
BASOPHILS NFR BLD AUTO: 0.4 % (ref 0–1.5)
BH CV ECHO MEAS - AO MAX PG (FULL): 2.5 MMHG
BH CV ECHO MEAS - AO MAX PG: 6.5 MMHG
BH CV ECHO MEAS - AO MEAN PG (FULL): 1.6 MMHG
BH CV ECHO MEAS - AO MEAN PG: 3.5 MMHG
BH CV ECHO MEAS - AO ROOT AREA (BSA CORRECTED): 1.7
BH CV ECHO MEAS - AO ROOT AREA: 10.9 CM^2
BH CV ECHO MEAS - AO ROOT DIAM: 3.7 CM
BH CV ECHO MEAS - AO V2 MAX: 127.6 CM/SEC
BH CV ECHO MEAS - AO V2 MEAN: 87.1 CM/SEC
BH CV ECHO MEAS - AO V2 VTI: 27 CM
BH CV ECHO MEAS - ASC AORTA: 3.8 CM
BH CV ECHO MEAS - AVA(I,A): 2.5 CM^2
BH CV ECHO MEAS - AVA(I,D): 2.5 CM^2
BH CV ECHO MEAS - AVA(V,A): 2.5 CM^2
BH CV ECHO MEAS - AVA(V,D): 2.5 CM^2
BH CV ECHO MEAS - BSA(HAYCOCK): 2.3 M^2
BH CV ECHO MEAS - BSA: 2.2 M^2
BH CV ECHO MEAS - BZI_BMI: 34.1 KILOGRAMS/M^2
BH CV ECHO MEAS - BZI_METRIC_HEIGHT: 177.8 CM
BH CV ECHO MEAS - BZI_METRIC_WEIGHT: 108 KG
BH CV ECHO MEAS - EDV(CUBED): 198.2 ML
BH CV ECHO MEAS - EDV(MOD-SP2): 121 ML
BH CV ECHO MEAS - EDV(MOD-SP4): 125 ML
BH CV ECHO MEAS - EDV(TEICH): 168.6 ML
BH CV ECHO MEAS - EF(CUBED): 77.6 %
BH CV ECHO MEAS - EF(MOD-BP): 72 %
BH CV ECHO MEAS - EF(MOD-SP2): 72.7 %
BH CV ECHO MEAS - EF(MOD-SP4): 72 %
BH CV ECHO MEAS - EF(TEICH): 69 %
BH CV ECHO MEAS - ESV(CUBED): 44.4 ML
BH CV ECHO MEAS - ESV(MOD-SP2): 33 ML
BH CV ECHO MEAS - ESV(MOD-SP4): 35 ML
BH CV ECHO MEAS - ESV(TEICH): 52.3 ML
BH CV ECHO MEAS - FS: 39.3 %
BH CV ECHO MEAS - IVS/LVPW: 0.95
BH CV ECHO MEAS - IVSD: 0.95 CM
BH CV ECHO MEAS - LA DIMENSION: 3.9 CM
BH CV ECHO MEAS - LA/AO: 1.1
BH CV ECHO MEAS - LAD MAJOR: 5.8 CM
BH CV ECHO MEAS - LAT PEAK E' VEL: 12 CM/SEC
BH CV ECHO MEAS - LATERAL E/E' RATIO: 6.4
BH CV ECHO MEAS - LV DIASTOLIC VOL/BSA (35-75): 55.6 ML/M^2
BH CV ECHO MEAS - LV IVRT: 0.07 SEC
BH CV ECHO MEAS - LV MASS(C)D: 226.7 GRAMS
BH CV ECHO MEAS - LV MASS(C)DI: 100.9 GRAMS/M^2
BH CV ECHO MEAS - LV MAX PG: 4 MMHG
BH CV ECHO MEAS - LV MEAN PG: 1.9 MMHG
BH CV ECHO MEAS - LV SYSTOLIC VOL/BSA (12-30): 15.6 ML/M^2
BH CV ECHO MEAS - LV V1 MAX: 100.2 CM/SEC
BH CV ECHO MEAS - LV V1 MEAN: 64 CM/SEC
BH CV ECHO MEAS - LV V1 VTI: 20.9 CM
BH CV ECHO MEAS - LVIDD: 5.8 CM
BH CV ECHO MEAS - LVIDS: 3.5 CM
BH CV ECHO MEAS - LVLD AP2: 8.5 CM
BH CV ECHO MEAS - LVLD AP4: 8.4 CM
BH CV ECHO MEAS - LVLS AP2: 7.5 CM
BH CV ECHO MEAS - LVLS AP4: 7.5 CM
BH CV ECHO MEAS - LVOT AREA (M): 3.1 CM^2
BH CV ECHO MEAS - LVOT AREA: 3.2 CM^2
BH CV ECHO MEAS - LVOT DIAM: 2 CM
BH CV ECHO MEAS - LVPWD: 1 CM
BH CV ECHO MEAS - MED PEAK E' VEL: 6.9 CM/SEC
BH CV ECHO MEAS - MEDIAL E/E' RATIO: 11.1
BH CV ECHO MEAS - MV A MAX VEL: 55.8 CM/SEC
BH CV ECHO MEAS - MV DEC SLOPE: 348.8 CM/SEC^2
BH CV ECHO MEAS - MV DEC TIME: 0.23 SEC
BH CV ECHO MEAS - MV E MAX VEL: 78.5 CM/SEC
BH CV ECHO MEAS - MV E/A: 1.4
BH CV ECHO MEAS - MV P1/2T MAX VEL: 90.9 CM/SEC
BH CV ECHO MEAS - MV P1/2T: 76.4 MSEC
BH CV ECHO MEAS - MVA P1/2T LCG: 2.4 CM^2
BH CV ECHO MEAS - MVA(P1/2T): 2.9 CM^2
BH CV ECHO MEAS - PA ACC SLOPE: 547.6 CM/SEC^2
BH CV ECHO MEAS - PA ACC TIME: 0.11 SEC
BH CV ECHO MEAS - PA PR(ACCEL): 28.3 MMHG
BH CV ECHO MEAS - RVDD: 3 CM
BH CV ECHO MEAS - SI(AO): 130.6 ML/M^2
BH CV ECHO MEAS - SI(CUBED): 68.5 ML/M^2
BH CV ECHO MEAS - SI(LVOT): 29.6 ML/M^2
BH CV ECHO MEAS - SI(MOD-SP2): 39.2 ML/M^2
BH CV ECHO MEAS - SI(MOD-SP4): 40 ML/M^2
BH CV ECHO MEAS - SI(TEICH): 51.7 ML/M^2
BH CV ECHO MEAS - SV(AO): 293.6 ML
BH CV ECHO MEAS - SV(CUBED): 153.8 ML
BH CV ECHO MEAS - SV(LVOT): 66.5 ML
BH CV ECHO MEAS - SV(MOD-SP2): 88 ML
BH CV ECHO MEAS - SV(MOD-SP4): 90 ML
BH CV ECHO MEAS - SV(TEICH): 116.3 ML
BH CV ECHO MEAS - TAPSE (>1.6): 1.9 CM
BH CV ECHO MEASUREMENTS AVERAGE E/E' RATIO: 8.31
BH CV VAS BP LEFT ARM: NORMAL MMHG
BH CV XLRA - RV BASE: 4.2 CM
BH CV XLRA - RV LENGTH: 8.6 CM
BH CV XLRA - RV MID: 3.2 CM
BH CV XLRA - TDI S': 13.9 CM/SEC
BILIRUB SERPL-MCNC: 2.5 MG/DL (ref 0–1.2)
BUN SERPL-MCNC: 15 MG/DL (ref 6–20)
BUN/CREAT SERPL: 14.6 (ref 7–25)
C CAYETANENSIS DNA STL QL NAA+NON-PROBE: NOT DETECTED
C COLI+JEJ+UPSA DNA STL QL NAA+NON-PROBE: NOT DETECTED
C PNEUM DNA NPH QL NAA+NON-PROBE: NOT DETECTED
CALCIUM SPEC-SCNC: 7.8 MG/DL (ref 8.6–10.5)
CHLORIDE SERPL-SCNC: 105 MMOL/L (ref 98–107)
CO2 SERPL-SCNC: 26 MMOL/L (ref 22–29)
CREAT SERPL-MCNC: 1.03 MG/DL (ref 0.76–1.27)
CRYPTOSP DNA STL QL NAA+NON-PROBE: NOT DETECTED
DEPRECATED RDW RBC AUTO: 46.2 FL (ref 37–54)
E HISTOLYT DNA STL QL NAA+NON-PROBE: NOT DETECTED
EAEC PAA PLAS AGGR+AATA ST NAA+NON-PRB: NOT DETECTED
EC STX1+STX2 GENES STL QL NAA+NON-PROBE: NOT DETECTED
EOSINOPHIL # BLD AUTO: 0.34 10*3/MM3 (ref 0–0.4)
EOSINOPHIL NFR BLD AUTO: 3.2 % (ref 0.3–6.2)
EPEC EAE GENE STL QL NAA+NON-PROBE: NOT DETECTED
ERYTHROCYTE [DISTWIDTH] IN BLOOD BY AUTOMATED COUNT: 15 % (ref 12.3–15.4)
ERYTHROCYTE [SEDIMENTATION RATE] IN BLOOD: 14 MM/HR (ref 0–20)
ETEC LTA+ST1A+ST1B TOX ST NAA+NON-PROBE: NOT DETECTED
FLUAV SUBTYP SPEC NAA+PROBE: NOT DETECTED
FLUBV RNA ISLT QL NAA+PROBE: NOT DETECTED
G LAMBLIA DNA STL QL NAA+NON-PROBE: NOT DETECTED
GFR SERPL CREATININE-BSD FRML MDRD: 74 ML/MIN/1.73
GLOBULIN UR ELPH-MCNC: 2.6 GM/DL
GLUCOSE SERPL-MCNC: 145 MG/DL (ref 65–99)
HADV DNA SPEC NAA+PROBE: NOT DETECTED
HCOV 229E RNA SPEC QL NAA+PROBE: NOT DETECTED
HCOV HKU1 RNA SPEC QL NAA+PROBE: NOT DETECTED
HCOV NL63 RNA SPEC QL NAA+PROBE: NOT DETECTED
HCOV OC43 RNA SPEC QL NAA+PROBE: NOT DETECTED
HCT VFR BLD AUTO: 37.8 % (ref 37.5–51)
HGB BLD-MCNC: 12.7 G/DL (ref 13–17.7)
HMPV RNA NPH QL NAA+NON-PROBE: NOT DETECTED
HPIV1 RNA ISLT QL NAA+PROBE: NOT DETECTED
HPIV2 RNA SPEC QL NAA+PROBE: NOT DETECTED
HPIV3 RNA NPH QL NAA+PROBE: NOT DETECTED
HPIV4 P GENE NPH QL NAA+PROBE: NOT DETECTED
IMM GRANULOCYTES # BLD AUTO: 0.04 10*3/MM3 (ref 0–0.05)
IMM GRANULOCYTES NFR BLD AUTO: 0.4 % (ref 0–0.5)
LEFT ATRIUM VOLUME INDEX: 22.2 ML/M^2
LEFT ATRIUM VOLUME: 50 ML
LYMPHOCYTES # BLD AUTO: 1.36 10*3/MM3 (ref 0.7–3.1)
LYMPHOCYTES NFR BLD AUTO: 12.8 % (ref 19.6–45.3)
M PNEUMO IGG SER IA-ACNC: NOT DETECTED
MAXIMAL PREDICTED HEART RATE: 161 BPM
MCH RBC QN AUTO: 28.7 PG (ref 26.6–33)
MCHC RBC AUTO-ENTMCNC: 33.6 G/DL (ref 31.5–35.7)
MCV RBC AUTO: 85.3 FL (ref 79–97)
MONOCYTES # BLD AUTO: 0.71 10*3/MM3 (ref 0.1–0.9)
MONOCYTES NFR BLD AUTO: 6.7 % (ref 5–12)
NEUTROPHILS NFR BLD AUTO: 76.5 % (ref 42.7–76)
NEUTROPHILS NFR BLD AUTO: 8.16 10*3/MM3 (ref 1.7–7)
NOROVIRUS GI+II RNA STL QL NAA+NON-PROBE: NOT DETECTED
NRBC BLD AUTO-RTO: 0 /100 WBC (ref 0–0.2)
P SHIGELLOIDES DNA STL QL NAA+NON-PROBE: NOT DETECTED
PLATELET # BLD AUTO: 101 10*3/MM3 (ref 140–450)
PMV BLD AUTO: 11.6 FL (ref 6–12)
POTASSIUM SERPL-SCNC: 4 MMOL/L (ref 3.5–5.2)
PROT SERPL-MCNC: 6 G/DL (ref 6–8.5)
RBC # BLD AUTO: 4.43 10*6/MM3 (ref 4.14–5.8)
RHINOVIRUS RNA SPEC NAA+PROBE: NOT DETECTED
RSV RNA NPH QL NAA+NON-PROBE: NOT DETECTED
RVA RNA STL QL NAA+NON-PROBE: NOT DETECTED
S ENT+BONG DNA STL QL NAA+NON-PROBE: NOT DETECTED
SAPO I+II+IV+V RNA STL QL NAA+NON-PROBE: NOT DETECTED
SHIGELLA SP+EIEC IPAH ST NAA+NON-PROBE: NOT DETECTED
SODIUM SERPL-SCNC: 136 MMOL/L (ref 136–145)
STRESS TARGET HR: 137 BPM
V CHOL+PARA+VUL DNA STL QL NAA+NON-PROBE: NOT DETECTED
V CHOLERAE DNA STL QL NAA+NON-PROBE: NOT DETECTED
WBC NRBC COR # BLD: 10.65 10*3/MM3 (ref 3.4–10.8)
Y ENTEROCOL DNA STL QL NAA+NON-PROBE: NOT DETECTED

## 2021-12-09 PROCEDURE — 99233 SBSQ HOSP IP/OBS HIGH 50: CPT | Performed by: INTERNAL MEDICINE

## 2021-12-09 PROCEDURE — 85652 RBC SED RATE AUTOMATED: CPT | Performed by: INTERNAL MEDICINE

## 2021-12-09 PROCEDURE — 87205 SMEAR GRAM STAIN: CPT | Performed by: NURSE PRACTITIONER

## 2021-12-09 PROCEDURE — 76705 ECHO EXAM OF ABDOMEN: CPT

## 2021-12-09 PROCEDURE — 87633 RESP VIRUS 12-25 TARGETS: CPT | Performed by: NURSE PRACTITIONER

## 2021-12-09 PROCEDURE — 87070 CULTURE OTHR SPECIMN AEROBIC: CPT | Performed by: NURSE PRACTITIONER

## 2021-12-09 PROCEDURE — 97161 PT EVAL LOW COMPLEX 20 MIN: CPT

## 2021-12-09 PROCEDURE — 80053 COMPREHEN METABOLIC PANEL: CPT | Performed by: NURSE PRACTITIONER

## 2021-12-09 PROCEDURE — 85025 COMPLETE CBC W/AUTO DIFF WBC: CPT | Performed by: NURSE PRACTITIONER

## 2021-12-09 PROCEDURE — 25010000002 HEPARIN (PORCINE) PER 1000 UNITS: Performed by: NURSE PRACTITIONER

## 2021-12-09 PROCEDURE — 0097U HC BIOFIRE FILMARRAY GI PANEL: CPT | Performed by: NURSE PRACTITIONER

## 2021-12-09 PROCEDURE — 93306 TTE W/DOPPLER COMPLETE: CPT

## 2021-12-09 PROCEDURE — 25010000002 PIPERACILLIN SOD-TAZOBACTAM PER 1 G: Performed by: NURSE PRACTITIONER

## 2021-12-09 PROCEDURE — 97165 OT EVAL LOW COMPLEX 30 MIN: CPT | Performed by: OCCUPATIONAL THERAPIST

## 2021-12-09 PROCEDURE — 25010000002 VANCOMYCIN PER 500 MG

## 2021-12-09 RX ADMIN — ACETAMINOPHEN 650 MG: 325 TABLET, FILM COATED ORAL at 22:41

## 2021-12-09 RX ADMIN — TAZOBACTAM SODIUM AND PIPERACILLIN SODIUM 3.38 G: 375; 3 INJECTION, SOLUTION INTRAVENOUS at 11:40

## 2021-12-09 RX ADMIN — SODIUM CHLORIDE, PRESERVATIVE FREE 10 ML: 5 INJECTION INTRAVENOUS at 22:26

## 2021-12-09 RX ADMIN — PANTOPRAZOLE SODIUM 40 MG: 40 TABLET, DELAYED RELEASE ORAL at 06:29

## 2021-12-09 RX ADMIN — HEPARIN SODIUM 5000 UNITS: 5000 INJECTION INTRAVENOUS; SUBCUTANEOUS at 22:26

## 2021-12-09 RX ADMIN — LISINOPRIL 5 MG: 5 TABLET ORAL at 11:39

## 2021-12-09 RX ADMIN — TAZOBACTAM SODIUM AND PIPERACILLIN SODIUM 3.38 G: 375; 3 INJECTION, SOLUTION INTRAVENOUS at 15:39

## 2021-12-09 RX ADMIN — HEPARIN SODIUM 5000 UNITS: 5000 INJECTION INTRAVENOUS; SUBCUTANEOUS at 15:39

## 2021-12-09 RX ADMIN — VANCOMYCIN HYDROCHLORIDE 1000 MG: 1 INJECTION, SOLUTION INTRAVENOUS at 06:29

## 2021-12-09 RX ADMIN — SODIUM CHLORIDE, PRESERVATIVE FREE 10 ML: 5 INJECTION INTRAVENOUS at 11:40

## 2021-12-09 RX ADMIN — DOXYCYCLINE 100 MG: 100 CAPSULE ORAL at 17:43

## 2021-12-09 RX ADMIN — DOXYCYCLINE 100 MG: 100 CAPSULE ORAL at 06:29

## 2021-12-09 RX ADMIN — TAZOBACTAM SODIUM AND PIPERACILLIN SODIUM 3.38 G: 375; 3 INJECTION, SOLUTION INTRAVENOUS at 00:04

## 2021-12-09 RX ADMIN — HEPARIN SODIUM 5000 UNITS: 5000 INJECTION INTRAVENOUS; SUBCUTANEOUS at 06:29

## 2021-12-09 RX ADMIN — VANCOMYCIN HYDROCHLORIDE 1000 MG: 1 INJECTION, SOLUTION INTRAVENOUS at 17:43

## 2021-12-09 NOTE — PLAN OF CARE
Goal Outcome Evaluation:  Plan of Care Reviewed With: patient        Progress: improving  Outcome Summary: PT eval completed.  Patient demonstrates good balance, strength, and safety awareness.  He completed bed mobility and transfers independently, and ambulated 500ft in hallway w/ distant supervision w/ no LOB or instability.  No further acute PT services indicated.  Anticipate safe D/C home w/ spouse when medically appropriate.

## 2021-12-09 NOTE — THERAPY EVALUATION
Patient Name: Kobi Gonzalez  : 1960    MRN: [id number]                              Today's Date: 2021       Admit Date: 2021    Visit Dx:     ICD-10-CM ICD-9-CM   1. Febrile illness  R50.9 780.60   2. Sepsis, due to unspecified organism, unspecified whether acute organ dysfunction present (HCC)  A41.9 038.9     995.91   3. Precordial pain  R07.2 786.51   4. Impaired mobility and ADLs  Z74.09 V49.89    Z78.9      Patient Active Problem List   Diagnosis   • Chronic tension-type headache, intractable   • Neck pain   • Sepsis, unspecified organism ([facility])   • Tick bite of lower back   • Fever   • Myalgia   • Celiac disease   • Hereditary hemochromatosis ([facility])   • Coronary artery disease involving native coronary artery   • Primary hypertension   • Mixed hyperlipidemia   • Simple chronic bronchitis ([facility])   • Febrile illness     Past Medical History:   Diagnosis Date   • Celiac disease    • GERD (gastroesophageal reflux disease)    • Hemochromatosis    • Hyperlipidemia    • Hypertension    • MI (myocardial infarction) ([facility])     1 STENT     Past Surgical History:   Procedure Laterality Date   • NO PAST SURGERIES        General Information     Row Name 21          OT Time and Intention    Document Type evaluation  -SD     Mode of Treatment occupational therapy  -SD     Row Name 21          General Information    Patient Profile Reviewed yes  -SD     Prior Level of Function independent:; all household mobility; community mobility; ADL's; work; driving; home management  -SD     Existing Precautions/Restrictions no known precautions/restrictions  -SD     Barriers to Rehab none identified  -SD     Row Name 21          Living Environment    Lives With spouse  -SD     Row Name 21          Home Main Entrance    Number of Stairs, Main Entrance three  -SD     Stair Railings, Main Entrance none  -SD     Row Name 21          Stairs Within Home, Primary     Number of Stairs, Within Home, Primary none  -SD     Stair Railings, Within Home, Primary none  -SD     Row Name 12/09/21 0948          Cognition    Orientation Status (Cognition) oriented x 4  -SD     Row Name 12/09/21 0948          Safety Issues, Functional Mobility    Safety Issues Affecting Function (Mobility) safety precaution awareness; safety precautions follow-through/compliance  -SD     Comment, Safety Issues/Impairments (Mobility) pt. reports that he has chronic back/neck pain  -SD           User Key  (r) = Recorded By, (t) = Taken By, (c) = Cosigned By    Initials Name Provider Type    Rody Engle, OT Occupational Therapist                 Mobility/ADL's     Row Name 12/09/21 1018          Bed Mobility    Bed Mobility rolling left; scooting/bridging; supine-sit  -SD     Rolling Left [place] (Bed Mobility) modified independence  -SD     Scooting/Bridging [place] (Bed Mobility) modified independence  -SD     Supine-Sit [place] (Bed Mobility) modified independence  -SD     Assistive Device (Bed Mobility) bed rails; head of bed elevated  -SD     Comment (Bed Mobility) pt. just needed assist for IV lines  -SD     Row Name 12/09/21 1018          Transfers    Transfers sit-stand transfer; bed-chair transfer  -SD     Bed-Chair [place] (Transfers) standby assist  -SD     Sit-Stand [place] (Transfers) standby assist  -SD     Row Name 12/09/21 1018          Functional Mobility    Functional Mobility- Ind. Level standby assist  -SD     Functional Mobility-Distance (Feet) 200  -SD     Row Name 12/09/21 1018          Activities of Daily Living    BADL Assessment/Intervention lower body dressing; upper body dressing  -SD     Row Name 12/09/21 1018          Lower Body Dressing Assessment/Training    [place] Level (Lower Body Dressing) don; socks; standby assist  -SD     Position (Lower Body Dressing) edge of bed sitting  -SD     Row Name 12/09/21 1018          Upper Body  Dressing Assessment/Training    [place] Level (Upper Body Dressing) doff; don; front opening garment; set up  -SD     Position (Upper Body Dressing) edge of bed sitting  -SD           User Key  (r) = Recorded By, (t) = Taken By, (c) = Cosigned By    Initials Name Provider Type    Rody Engle OT Occupational Therapist               Obj/Interventions     [facility] Name 12/09/21 1023          Sensory Assessment (Somatosensory)    Sensory Assessment (Somatosensory) sensation intact  -[facility] Name 12/09/21 1023          Vision Assessment/Intervention    Visual Impairment/Limitations WFL  -SD     Row Name 12/09/21 1023          Range of Motion Comprehensive    General Range of Motion bilateral upper extremity ROM WNL  -SD     Row Name 12/09/21 1023          Strength Comprehensive (MMT)    Comment, General Manual Muscle Testing (MMT) Assessment B UE Strength: 5/5 grossly  -SD     Row Name 12/09/21 1023          Balance    Balance Assessment sitting static balance; sitting dynamic balance; sit to stand dynamic balance; standing static balance; standing dynamic balance  -SD     Static Sitting Balance WFL; unsupported; sitting, edge of bed  -SD     Dynamic Sitting Balance WFL; unsupported; sitting, edge of bed  -SD     Sit to Stand Dynamic Balance WFL; unsupported; standing  -SD     Static Standing Balance WFL; unsupported; standing  -SD     Dynamic Standing Balance WFL; unsupported; standing  -SD           User Key  (r) = Recorded By, (t) = Taken By, (c) = Cosigned By    Initials Name Provider Type    Rody Engle OT Occupational Therapist               Goals/Plan    No documentation.                Clinical Impression     Row Name 12/09/21 1025          Pain Assessment    Additional Documentation Pain Scale: Numbers Pre/Post-Treatment (Group)  -SD     Row Name 12/09/21 1025          Pain Scale: Numbers Pre/Post-Treatment    Pretreatment Pain Rating 6/10  -SD     Posttreatment Pain Rating 6/10   -SD     Pain Location - Side Bilateral  -SD     Pain Location - Orientation lower  -SD     Pain Location back; neck; extremity  -SD     Pain Intervention(s) Repositioned; Ambulation/increased activity  -SD     Row Name 12/09/21 1025          Plan of Care Review    Plan of Care Reviewed With patient  -SD     Progress improving  -SD     Outcome Summary OT IE completed. Pt. completed bed mobility, transfers, and functional mobility with SBA. Pt.'s O2 sats @96% on 2LO2NC upon arrival, then ambulated in hallway without supplemental O2 & O2 sats @96% once sitting in chair. Pt. completed LBD with SBA & dynamic reaching tasks in standing with SBA. Pt. reporting no changes in independence with self-care & no SOB or fatigue this day. OT will not follow pt. for skilled services, as pt. presents at baseline. Recommend home upon d/c.  -SD     Row Name 12/09/21 1025          Therapy Assessment/Plan (OT)    Criteria for Skilled Therapeutic Interventions Met (OT) no problems identified which require skilled intervention  -SD     Therapy Frequency (OT) evaluation only  -SD     Row Name 12/09/21 1025          Therapy Plan Review/Discharge Plan (OT)    Anticipated Discharge Disposition (OT) home  -SD     Row Name 12/09/21 1025          Vital Signs    Pre Systolic BP Rehab 118  -SD     Pre Treatment Diastolic BP 76  -SD     Post Systolic BP Rehab 132  -SD     Post Treatment Diastolic BP 71  -SD     Posttreatment Heart Rate (beats/min) 61  -SD     Pre SpO2 (%) 95  -SD     O2 Delivery Pre Treatment supplemental O2  -SD     Intra SpO2 (%) 92  -SD     O2 Delivery Intra Treatment room air  -SD     Post SpO2 (%) 96  -SD     O2 Delivery Post Treatment room air  -SD     Pre Patient Position Supine  -SD     Intra Patient Position Standing  -SD     Post Patient Position Sitting  -SD     Row Name 12/09/21 1025          Positioning and Restraints    Pre-Treatment Position in bed  -SD     Post Treatment Position chair  -SD     In Chair notified  nsg; reclined; call light within reach; encouraged to call for assist; legs elevated  -SD           User Key  (r) = Recorded By, (t) = Taken By, (c) = Cosigned By    Initials Name Provider Type    Rody Engle OT Occupational Therapist               Outcome Measures     Row Name 12/09/21 1029          How much help from another is currently needed...    Putting on and taking off regular lower body clothing? 4  -SD     Bathing (including washing, rinsing, and drying) 4  -SD     Toileting (which includes using toilet bed pan or urinal) 4  -SD     Putting on and taking off regular upper body clothing 4  -SD     Taking care of personal grooming (such as brushing teeth) 4  -SD     Eating meals 4  -SD     AM-PAC 6 Clicks Score (OT) 24  -SD     Row Name 12/09/21 1029          Functional Assessment    Outcome Measure Options AM-PAC 6 Clicks Daily Activity (OT)  -SD           User Key  (r) = Recorded By, (t) = Taken By, (c) = Cosigned By    Initials Name Provider Type    Rody Engle OT Occupational Therapist                Occupational Therapy Education                 Title: PT OT SLP Therapies (Done)     Topic: Occupational Therapy (Done)     Point: ADL training (Done)     Description:   Instruct learner(s) on proper safety adaptation and remediation techniques during self care or transfers.   Instruct in proper use of assistive devices.              Learning Progress Summary           Patient Acceptance, E, VU,DU by SD at 12/9/2021 1030    Comment: Pt. educated on role of OT & verbalized understanding.                   Point: Home exercise program (Done)     Description:   Instruct learner(s) on appropriate technique for monitoring, assisting and/or progressing therapeutic exercises/activities.              Learning Progress Summary           Patient Acceptance, E, VU,DU by SD at 12/9/2021 1030    Comment: Pt. educated on role of OT & verbalized understanding.                   Point:  Precautions (Done)     Description:   Instruct learner(s) on prescribed precautions during self-care and functional transfers.              Learning Progress Summary           Patient Acceptance, E, VU,DU by SD at 12/9/2021 1030    Comment: Pt. educated on role of OT & verbalized understanding.                   Point: Body mechanics (Done)     Description:   Instruct learner(s) on proper positioning and spine alignment during self-care, functional mobility activities and/or exercises.              Learning Progress Summary           Patient Acceptance, E, VU,DU by SD at 12/9/2021 1030    Comment: Pt. educated on role of OT & verbalized understanding.                               User Key     Initials Effective Dates Name Provider Type Discipline    SD 06/16/21 -  Rody Madrigal OT Occupational Therapist OT              OT Recommendation and Plan  Therapy Frequency (OT): evaluation only  Plan of Care Review  Plan of Care Reviewed With: patient  Progress: improving  Outcome Summary: OT IE completed. Pt. completed bed mobility, transfers, and functional mobility with SBA. Pt.'s O2 sats @96% on 2LO2NC upon arrival, then ambulated in hallway without supplemental O2 & O2 sats @96% once sitting in chair. Pt. completed LBD with SBA & dynamic reaching tasks in standing with SBA. Pt. reporting no changes in independence with self-care & no SOB or fatigue this day. OT will not follow pt. for skilled services, as pt. presents at baseline. Recommend home upon d/c.     Time Calculation:    Time Calculation- OT     Row Name 12/09/21 1031             Time Calculation- OT    OT Received On 12/09/21  -SD              Untimed Charges    OT Eval/Re-eval Minutes 43  -SD              Total Minutes    Untimed Charges Total Minutes 43  -SD       Total Minutes 43  -SD            User Key  (r) = Recorded By, (t) = Taken By, (c) = Cosigned By    Initials Name Provider Type    SD Rody Madrigal OT Occupational Therapist               Therapy Charges for Today     Code Description Service Date Service Provider Modifiers Qty    20387041517 HC OT EVAL LOW COMPLEXITY 3 12/9/2021 Rody Madrigal OT GO 1               Rody Madrigal OT  12/9/2021

## 2021-12-09 NOTE — CASE MANAGEMENT/SOCIAL WORK
Discharge Planning Assessment  [facility]     Patient Name: Kobi Gonzalez  MRN: [id number]  Today's Date: 12/9/2021    Admit Date: 12/8/2021     Discharge Needs Assessment     Row Name 12/09/21 1118       Living Environment    Lives With spouse    Current Living Arrangements home/apartment/condo    Primary Care Provided by self    Provides Primary Care For no one    Family Caregiver if Needed spouse; child(gregorio), adult    Able to Return to Prior Arrangements yes       Transition Planning    Patient/Family Anticipates Transition to home with family    Transportation Anticipated family or friend will provide       Discharge Needs Assessment    Readmission Within the Last 30 Days no previous admission in last 30 days    Equipment Currently Used at Home cpap; oxygen  oxygen concentrator with CPAP at night    Current Discharge Risk chronically ill               Discharge Plan     Row Name 12/09/21 1120       Plan    Plan home    Patient/Family in Agreement with Plan yes    Plan Comments I met with Mr. Gonzalez at the bedside. He lives with his wife in [facility]. He is independent with mobility and activities of daily living. He drives himself when leaving the home. He uses a CPAP machine with an oxygen concentrator at bedtime. He is not current with any outpatient therapies. Mr. Gonzalez anticipates returning home at the time of discharge. Mr. Gonzalez states that his wife can transport him home. Case management will continue to follow along with Mr. Gonzalez's plan of care and assist with discharge needs.    Final Discharge Disposition Code 30 - still a patient              Continued Care and Services - Admitted Since 12/8/2021    Coordination has not been started for this encounter.          Demographic Summary     Row Name 12/09/21 1117       General Information    General Information Comments I confirmed Leila Kolb to be Mr. Gonzalez's PCP. I confirmed his insurance as [facility].               Functional Status      Row Name 12/09/21 1118       Functional Status, IADL    Medications independent    Meal Preparation independent    Housekeeping independent    Laundry independent    Shopping independent               Psychosocial    No documentation.                Abuse/Neglect    No documentation.                Legal    No documentation.                Substance Abuse    No documentation.                Patient Forms    No documentation.                   Gabriele Franco RN

## 2021-12-09 NOTE — PROGRESS NOTES
[facility] Medicine Services  PROGRESS NOTE    Patient Name: Kobi Gonzalez  : 1960  MRN: [id number]    Date of Admission: 2021  Primary Care Physician: Leila Kolb APRN    Subjective   Subjective     CC:  Fever, chest pain    HPI:  Pt feeling better today, no fevers since last pm. Denies any chest pain currently.     ROS:  Gen- as above  CV- No chest pain, palpitations  Resp- No cough, dyspnea  GI- No N/V/D, abd pain        Objective   Objective     Vital Signs:   Temp:  [98.3 °F (36.8 °C)-101.1 °F (38.4 °C)] 98.3 °F (36.8 °C)  Heart Rate:  [] 74  Resp:  [20] 20  BP: (113-146)/(66-88) 113/77  Flow (L/min):  [2] 2     Physical Exam:  Constitutional: No acute distress, awake, alert  HENT: NCAT, mucous membranes moist  Respiratory: Clear to auscultation bilaterally, respiratory effort normal   Cardiovascular: RRR, no murmurs, rubs, or gallops  Gastrointestinal: Positive bowel sounds, soft, nontender, nondistended  Musculoskeletal: No bilateral ankle edema  Psychiatric: Appropriate affect, cooperative  Neurologic: Oriented x 3, strength symmetric in all extremities, Cranial Nerves grossly intact to confrontation, speech clear  Skin: No rashes    Results Reviewed:  LAB RESULTS:      Lab 21  1627 21  1522 21  1318   WBC  --   --  15.87*   HEMOGLOBIN  --   --  14.6   HEMATOCRIT  --   --  41.7   PLATELETS  --   --  144   NEUTROS ABS  --   --  14.55*   IMMATURE GRANS (ABS)  --   --  0.06*   LYMPHS ABS  --   --  0.60*   MONOS ABS  --   --  0.52   EOS ABS  --   --  0.09   MCV  --   --  81.4   CRP  --  0.73*  --    PROCALCITONIN  --   --  0.43*   LACTATE 1.7  --  2.7*         Lab 21  1318   SODIUM 132*   POTASSIUM 4.3   CHLORIDE 97*   CO2 24.0   ANION GAP 11.0   BUN 19   CREATININE 1.09   GLUCOSE 177*   CALCIUM 9.3   HEMOGLOBIN A1C 6.30*         Lab 21  1318   TOTAL PROTEIN 7.8   ALBUMIN 4.40   GLOBULIN 3.4   ALT (SGPT) 17   AST (SGOT) 21    BILIRUBIN 2.3*   ALK PHOS 84   LIPASE 42         Lab 12/08/21  1522 12/08/21  1318   PROBNP  --  31.7   TROPONIN T <0.010 <0.010         Lab 12/08/21  1522   CHOLESTEROL 123   LDL CHOL 53   HDL CHOL 49   TRIGLYCERIDES 115             Brief Urine Lab Results  (Last result in the past 365 days)      Color   Clarity   Blood   Leuk Est   Nitrite   Protein   CREAT   Urine HCG        12/08/21 1522 Yellow   Clear   Negative   Negative   Negative   Negative                 Microbiology Results Abnormal     Procedure Component Value - Date/Time    COVID PRE-OP / PRE-PROCEDURE SCREENING ORDER (NO ISOLATION) - Swab, Nasopharynx [[id number]]  (Normal) Collected: 12/08/21 1337    Lab Status: Final result Specimen: Swab from Nasopharynx Updated: 12/08/21 1409    Narrative:      The following orders were created for panel order COVID PRE-OP / PRE-PROCEDURE SCREENING ORDER (NO ISOLATION) - Swab, Nasopharynx.  Procedure                               Abnormality         Status                     ---------                               -----------         ------                     COVID-19 and FLU A/B PCR...[[id number]]  Normal              Final result                 Please view results for these tests on the individual orders.    COVID-19 and FLU A/B PCR - Swab, Nasopharynx [[id number]]  (Normal) Collected: 12/08/21 1337    Lab Status: Final result Specimen: Swab from Nasopharynx Updated: 12/08/21 1409     COVID19 Not Detected     Influenza A PCR Not Detected     Influenza B PCR Not Detected    Narrative:      Fact sheet for providers: https://www.fda.gov/media/384685/download    Fact sheet for patients: https://www.fda.gov/media/512478/download    Test performed by PCR.          CT Abdomen Pelvis Without Contrast    Result Date: 12/8/2021  EXAMINATION: CT ABDOMEN PELVIS WO CONTRAST-  INDICATION: Sepsis with no clear source and left flank pain; R50.9-Fever, unspecified; A41.9-Sepsis, unspecified organism; R07.2-Precordial pain   TECHNIQUE: Noncontrast CT of the abdomen and pelvis  COMPARISON: NONE  FINDINGS:  Peripheral reticulations at the lung bases which otherwise appear clear. Unremarkable appearance of the liver. There is a single small gallstone within the gallbladder with otherwise unremarkable appearance of the gallbladder and bile ducts. Unremarkable appearance of the spleen, pancreas, and adrenal glands. Excreted contrast within the bilateral renal collecting systems and bladder from recent contrast-enhanced CT somewhat limits evaluation for renal and ureteral stones; there is a questionable punctate nonobstructing stone at the right superior pole (series 2 image 33). There is no evidence of hydronephrosis; note is made of small bilateral extrarenal pelves. No filling defects within the renal collecting systems or bladder. No evidence of perinephric fat stranding or fluid. Unremarkable appearance of the prostate and seminal vesicles. Colonic diverticulosis without diverticulitis. Normal appendix. Otherwise unremarkable appearance of the GI tract. Unremarkable noncontrast appearance of the vasculature. No bulky abdominopelvic adenopathy. No conspicuous intra-abdominal fat stranding. No pneumoperitoneum or free intra-abdominal fluid. The body wall soft tissues appear unremarkable. No acute osseous findings. Bilateral L5 pars defects with 7 mm anterolisthesis of L5 on S1.      Impression:  No acute abdominopelvic findings. No findings to explain left flank pain. Nonobstructing renal stone in the right superior pole. Cholelithiasis without CT evidence of cholecystitis.   This report was finalized on 12/8/2021 7:39 PM by Roney Rodríguez MD.      XR Chest 1 View    Result Date: 12/8/2021  EXAMINATION: XR CHEST 1 VW-12/08/2021:  INDICATION: Chest Pain, triage protocol.  COMPARISON: NONE.  FINDINGS: Portable chest reveals cardiac and mediastinal silhouettes within normal limits. The lung fields are clear. No focal parenchymal  opacification present.  No pleural effusion or pneumothorax. The bony structures are unremarkable.      Impression: No acute cardiopulmonary disease.  D:  12/08/2021 E:  12/08/2021       CT Chest Pulmonary Embolism    Result Date: 12/8/2021  EXAMINATION: CT CHEST PULMONARY EMBOLISM-12/08/2021:  INDICATION: PE suspected, high prob, shortness of breath.  TECHNIQUE: Multiple axial CT imaging was obtained of the chest following the administration of intravenous contrast.  The radiation dose reduction device was turned on for each scan per the ALARA (As Low as Reasonably Achievable) protocol.  COMPARISON: NONE.  FINDINGS: The thyroid is homogeneous. No mediastinal mass or adenopathy. The cardiac chambers are within normal limits. No pericardial effusion. No bulky hilar or axillary lymphadenopathy. No filling defect in the segmental or subsegmental arteries. Degenerative changes seen within the spine. The visualized upper abdomen reveals some chronic changes and scarring in the lung bases bilaterally. No superimposed infiltrate. No pulmonary mass or nodule. Degenerative changes seen within the spine.      Impression: No evidence of pulmonary embolism. No CT evidence of acute intrathoracic abnormality. No superimposed infectious process.  D:  12/08/2021 E:  12/08/2021             I have reviewed the medications:  Scheduled Meds:doxycycline, 100 mg, Oral, Q12H  heparin (porcine), 5,000 Units, Subcutaneous, Q8H  lisinopril, 5 mg, Oral, Q24H  pantoprazole, 40 mg, Oral, Q AM  piperacillin-tazobactam, 3.375 g, Intravenous, Q8H  senna-docusate sodium, 2 tablet, Oral, BID  sodium chloride, 10 mL, Intravenous, Q12H  vancomycin, 1,000 mg, Intravenous, Q12H      Continuous Infusions:Pharmacy to dose vancomycin,   sodium chloride, 100 mL/hr, Last Rate: 100 mL/hr (12/08/21 8420)      PRN Meds:.•  acetaminophen **OR** acetaminophen **OR** acetaminophen  •  senna-docusate sodium **AND** polyethylene glycol **AND** bisacodyl **AND**  bisacodyl  •  ipratropium-albuterol  •  melatonin  •  ondansetron  •  Pharmacy to dose vancomycin  •  sodium chloride  •  sodium chloride    Assessment/Plan   Assessment & Plan     Active Hospital Problems    Diagnosis  POA   • Sepsis, unspecified organism (HCC) [A41.9]  Unknown   • Tick bite of lower back [S30.860A, W57.XXXA]  Unknown   • Fever [R50.9]  Unknown   • Myalgia [M79.10]  Unknown   • Celiac disease [K90.0]  Unknown   • Hereditary hemochromatosis (HCC) [E83.110]  Unknown   • Coronary artery disease involving native coronary artery [I25.10]  Unknown   • Primary hypertension [I10]  Unknown   • Mixed hyperlipidemia [E78.2]  Unknown   • Simple chronic bronchitis (HCC) [J41.0]  Unknown   • Febrile illness [R50.9]  Yes      Resolved Hospital Problems   No resolved problems to display.        Brief Hospital Course to date:  Kobi Gonzalez is a 60 y.o. male with PMH of CAD s/p stent (2018), COPD, hx of smoking (quit 2003), HTN, GERD, chronic low back pain, celiac disease, hemochromatosis, HLD presented with fever, arthralgia, chest pain, and shortness of breath. Fevers, chest pain  have been intermittent since June and pt had a LHC at [facility] (Dr. Dallas Centeno) one week prior to this admission which showed no obstructive disease.  Pt was found to be febrile with leukocytosis upon admission.    Plan:    Sepsis unknown origin (fever, leukocytosis)  -Fluid Resuscitation  -Lactic acid on arrival 2.7, repeat was wnl  -elevated procal 0.43, WBC 15.87  -blood cultures, GI PCR, respiratory PCR PENDING  -Tick borne panel PENDING- of note, pt had tick bite over the summer and did have tick borne workup at that time which was negative.   -zosyn, vanco, doxy  -COVID/Influenza negative  -- all imaging thus far unremarkable  -- awaiting GB U/S given hyperbilirubinemia upon admission  -- consult ID   -- ? Still's Disease, check ferritin, esr vs other (would need to rule out malignancy if infectious workup  negative)     Chest pain(appears pleuritic)  CAD s/p stent in 2018  -troponin neg   -BNP normal  -ECHO PENDING  -EKG Sinus Tach Qt/Qtc 350/456, no ST changes  -- as mentioned, pt had LHC last week with Dr. Centeno which was reportedly unremarkable     Elevated Glucose  -- A1c 6.3     Hyponatremia  -Na+ 132, corrected 133      HTN/HLD  -Lisinopril     Hyperbilirubinemia  -total bili 2.3  -GB U/S PENDING     Obesity  -BMI 33.00  -- complicates all aspects of care     Hx Hemochromatosis     Celiac's disease  -Celiac diet     GERD  -Protonix     Chronic low back pain  -gabapentin on home med list but juan report #[id number] show no controlled substances.     DVT prophylaxis:  Medical DVT prophylaxis orders are present.       AM-PAC 6 Clicks Score (PT): 24 (12/08/21 2047)    Disposition: I expect the patient to be discharged TBD    CODE STATUS:   Code Status and Medical Interventions:   Ordered at: 12/08/21 2422     Level Of Support Discussed With:    Patient     Code Status (Patient has no pulse and is not breathing):    CPR (Attempt to Resuscitate)     Medical Interventions (Patient has pulse or is breathing):    Full Support       Dulce Vasquez MD  12/09/21

## 2021-12-09 NOTE — THERAPY DISCHARGE NOTE
Patient Name: Kobi Gonzalez  : 1960    MRN: [id number]                              Today's Date: 2021       Admit Date: 2021    Visit Dx:     ICD-10-CM ICD-9-CM   1. Febrile illness  R50.9 780.60   2. Sepsis, due to unspecified organism, unspecified whether acute organ dysfunction present (HCC)  A41.9 038.9     995.91   3. Precordial pain  R07.2 786.51   4. Impaired mobility and ADLs  Z74.09 V49.89    Z78.9      Patient Active Problem List   Diagnosis   • Chronic tension-type headache, intractable   • Neck pain   • Sepsis, unspecified organism (HCC)   • Tick bite of lower back   • Fever   • Myalgia   • Celiac disease   • Hereditary hemochromatosis (HCC)   • Coronary artery disease involving native coronary artery   • Primary hypertension   • Mixed hyperlipidemia   • Simple chronic bronchitis ([facility])   • Febrile illness     Past Medical History:   Diagnosis Date   • Celiac disease    • GERD (gastroesophageal reflux disease)    • Hemochromatosis    • Hyperlipidemia    • Hypertension    • MI (myocardial infarction) ([facility])     1 STENT     Past Surgical History:   Procedure Laterality Date   • NO PAST SURGERIES        General Information     Row Name 21 1510          Physical Therapy Time and Intention    Document Type evaluation; discharge evaluation/summary  -MB     Mode of Treatment physical therapy  -MB     Row Name 21 1510          General Information    Patient Profile Reviewed yes  -MB     Prior Level of Function independent:; bed mobility; ADL's; transfer; gait; all household mobility; community mobility; driving; work  -MB     Existing Precautions/Restrictions no known precautions/restrictions  -MB     Barriers to Rehab none identified  -MB     Row Name 21 1510          Living Environment    Lives With spouse  -MB     Row Name 21 1510          Home Main Entrance    Number of Stairs, Main Entrance three  -MB     Stair Railings, Main Entrance none  -MB     Row Name  12/09/21 1510          Stairs Within Home, Primary    Number of Stairs, Within Home, Primary none  -MB     Row Name 12/09/21 1510          Cognition    Orientation Status (Cognition) oriented x 4  -MB     Row Name 12/09/21 1510          Safety Issues, Functional Mobility    Impairments Affecting Function (Mobility) pain  -MB     Comment, Safety Issues/Impairments (Mobility) No safety concerns observed.  -MB           User Key  (r) = Recorded By, (t) = Taken By, (c) = Cosigned By    Initials Name Provider Type    Roshni Marie, PT Physical Therapist               Mobility     Row Name 12/09/21 1510          Bed Mobility    Supine-Sit [place] (Bed Mobility) modified independence  -MB     Assistive Device (Bed Mobility) bed rails; head of bed elevated  -MB     Comment (Bed Mobility) Pt. completed w/out difficulty.  -MB     Row Name 12/09/21 1510          Transfers    Comment (Transfers) No LOB or instability observed.  -MB     Row Name 12/09/21 1510          Bed-Chair Transfer    Bed-Chair [place] (Transfers) independent  -MB     Assistive Device (Bed-Chair Transfers) other (see comments)  no AD  -MB     Row Name 12/09/21 1510          Sit-Stand Transfer    Sit-Stand [place] (Transfers) independent  -MB     Assistive Device (Sit-Stand Transfers) other (see comments)  no AD  -MB     Row Name 12/09/21 1510          Gait/Stairs (Locomotion)    [place] Level (Gait) supervision  -MB     Assistive Device (Gait) other (see comments)  no AD  -MB     Distance in Feet (Gait) 500  -MB     Comment (Gait/Stairs) Gait WNL.  Supervision only to manage IV pole.  No LOB or instability.  -MB           User Key  (r) = Recorded By, (t) = Taken By, (c) = Cosigned By    Initials Name Provider Type    Roshni Marie, PT Physical Therapist               Obj/Interventions     Row Name 12/09/21 1510          Strength Comprehensive (MMT)    General Manual Muscle Testing (MMT) Assessment no strength deficits  identified  -MB     Row Name 12/09/21 1510          Balance    Balance Assessment sitting static balance; sitting dynamic balance; standing static balance; standing dynamic balance  -MB     Static Sitting Balance WNL  -MB     Dynamic Sitting Balance WNL  -MB     Static Standing Balance WFL; unsupported  -MB     Dynamic Standing Balance WFL; unsupported  -MB     Balance Interventions sitting; standing; sit to stand; dynamic; highly challenging; dynamic reaching; narrowed base of support; single limb stance; step overs; tandem standing; weight shifting activity; manual resistance applied during activity; combined head and eye movements; eyes closed during activity  -MB     Row Name 12/09/21 1510          Sensory Assessment (Somatosensory)    Sensory Assessment (Somatosensory) LE sensation intact  -MB           User Key  (r) = Recorded By, (t) = Taken By, (c) = Cosigned By    Initials Name Provider Type    Roshni Marie, PT Physical Therapist               Goals/Plan    No documentation.                Clinical Impression     Row Name 12/09/21 1604          Pain Scale: Numbers Pre/Post-Treatment    Pretreatment Pain Rating 5/10  -MB     Posttreatment Pain Rating 5/10  -MB     Pain Location - Side Bilateral  -MB     Pain Location - Orientation lower  -MB     Pain Location back  -MB     Pain Intervention(s) Ambulation/increased activity; Repositioned  -MB     Row Name 12/09/21 6938          Plan of Care Review    Plan of Care Reviewed With patient  -MB     Progress improving  -MB     Outcome Summary PT eval completed.  Patient demonstrates good balance, strength, and safety awareness.  He completed bed mobility and transfers independently, and ambulated 500ft in hallway w/ distant supervision w/ no LOB or instability.  No further acute PT services indicated.  Anticipate safe D/C home w/ spouse when medically appropriate.  -MB     Row Name 12/09/21 5585          Therapy Assessment/Plan (PT)    Patient/Family  Therapy Goals Statement (PT) Return to home, PLOF, work.  -MB     Criteria for Skilled Interventions Met (PT) no; no problems identified which require skilled intervention  -MB     Row Name 12/09/21 1603          Vital Signs    Pre Systolic BP Rehab --  VSS.  RN cleared for PT.  -MB     Pre Patient Position Supine  -MB     Intra Patient Position Standing  -MB     Post Patient Position Sitting  -MB     Row Name 12/09/21 1603          Positioning and Restraints    Pre-Treatment Position in bed  -MB     Post Treatment Position chair  -MB     In Chair notified nsg; reclined; call light within reach; encouraged to call for assist; legs elevated  -MB           User Key  (r) = Recorded By, (t) = Taken By, (c) = Cosigned By    Initials Name Provider Type    Roshni Marie, PT Physical Therapist               Outcome Measures     Row Name 12/09/21 1605          How much help from another person do you currently need...    Turning from your back to your side while in flat bed without using bedrails? 4  -MB     Moving from lying on back to sitting on the side of a flat bed without bedrails? 4  -MB     Moving to and from a bed to a chair (including a wheelchair)? 4  -MB     Standing up from a chair using your arms (e.g., wheelchair, bedside chair)? 4  -MB     Climbing 3-5 steps with a railing? 3  -MB     To walk in hospital room? 4  -MB     AM-PAC 6 Clicks Score (PT) 23  -MB     Row Name 12/09/21 1605 12/09/21 1029       Functional Assessment    Outcome Measure Options AM-PAC 6 Clicks Basic Mobility (PT)  -MB AM-PAC 6 Clicks Daily Activity (OT)  -SD          User Key  (r) = Recorded By, (t) = Taken By, (c) = Cosigned By    Initials Name Provider Type    Rody Engle, OT Occupational Therapist    Roshni Marie, PT Physical Therapist                PT Recommendation and Plan     Plan of Care Reviewed With: patient  Progress: improving  Outcome Summary: PT eval completed.  Patient demonstrates good  balance, strength, and safety awareness.  He completed bed mobility and transfers independently, and ambulated 500ft in hallway w/ distant supervision w/ no LOB or instability.  No further acute PT services indicated.  Anticipate safe D/C home w/ spouse when medically appropriate.     Time Calculation:    PT Charges     Row Name 12/09/21 1606             Time Calculation    Start Time 1510  -MB      PT Received On 12/09/21  -MB              Untimed Charges    PT Eval/Re-eval Minutes 46  -MB              Total Minutes    Untimed Charges Total Minutes 46  -MB       Total Minutes 46  -MB            User Key  (r) = Recorded By, (t) = Taken By, (c) = Cosigned By    Initials Name Provider Type    Roshni Marie, PT Physical Therapist              Therapy Charges for Today     Code Description Service Date Service Provider Modifiers Qty    [id number] HC PT EVAL LOW COMPLEXITY 4 12/9/2021 Roshni Funez, PT GP 1          PT G-Codes  Outcome Measure Options: AM-PAC 6 Clicks Basic Mobility (PT)  AM-PAC 6 Clicks Score (PT): 23  AM-PAC 6 Clicks Score (OT): 24    PT Discharge Summary  Anticipated Discharge Disposition (PT): home    Roshni Funez, PT  12/9/2021

## 2021-12-09 NOTE — PLAN OF CARE
Problem: Adult Inpatient Plan of Care  Goal: Plan of Care Review  Recent Flowsheet Documentation  Taken 12/9/2021 1025 by Rody Madrigal OT  Progress: improving  Plan of Care Reviewed With: patient  Outcome Summary: OT IE completed. Pt. completed bed mobility, transfers, and functional mobility with SBA. Pt.'s O2 sats @96% on 2LO2NC upon arrival, then ambulated in hallway without supplemental O2 & O2 sats @96% once sitting in chair. Pt. completed LBD with SBA & dynamic reaching tasks in standing with SBA. Pt. reporting no changes in independence with self-care & no SOB or fatigue this day. OT will not follow pt. for skilled services, as pt. presents at baseline. Recommend home upon d/c.   Goal Outcome Evaluation:  Plan of Care Reviewed With: patient        Progress: improving  Outcome Summary: OT IE completed. Pt. completed bed mobility, transfers, and functional mobility with SBA. Pt.'s O2 sats @96% on 2LO2NC upon arrival, then ambulated in hallway without supplemental O2 & O2 sats @96% once sitting in chair. Pt. completed LBD with SBA & dynamic reaching tasks in standing with SBA. Pt. reporting no changes in independence with self-care & no SOB or fatigue this day. OT will not follow pt. for skilled services, as pt. presents at baseline. Recommend home upon d/c.

## 2021-12-09 NOTE — PAYOR COMM NOTE
"Cielo Gonzalez (60 y.o. Male)             Date of Birth Social Security Number Address Home Phone MRN    1960  [address]  [facility] [address] [phone] [id number]    Yarsanism Marital Status             None        Admission Date Admission Type Admitting Provider Attending Provider Department, Room/Bed    21 Emergency Dulce Vasquez MD Howard, Gabriela Kirk, MD [facility] 3E, S346/1    Discharge Date Discharge Disposition Discharge Destination                         Attending Provider: Dulce Vasquez MD    Allergies: No Known Allergies    Isolation: None   Infection: None   Code Status: CPR   Advance Care Planning Activity    Ht: 177.8 cm (70\")   Wt: 108 kg (238 lb)    Admission Cmt: None   Principal Problem: None                Active Insurance as of 2021     Primary Coverage     Payor Plan Insurance Group Employer/Plan Group    [facility] MEDICAID      Payor Plan Address Payor Plan Phone Number Payor Plan Fax Number Effective Dates    [address] [address] [phone]  2021 - None Entered    [facility] [id number]       Subscriber Name Subscriber Birth Date Member ID       CIELO GONZALEZ 1960 [id number]                 Emergency Contacts      (Rel.) Home Phone Work Phone Mobile Phone    Karlie Gonzalez (Spouse) [phone] -- [phone]    Deb Freeman (Daughter) [phone] -- [phone]            Insurance Information                [facility] MEDICAID Phone: [phone]    Subscriber: Cielo Gonzalez Subscriber#: [id number]    Group#: -- Precert#: --             History & Physical      Robb James MD at [address] [place] [address] Medicine Services  HISTORY AND PHYSICAL    Patient Name: Cielo Gonzalez  : 1960  MRN: [id number]  Primary Care Physician: Leila Kolb, OXANA  Date of admission: 2021      Subjective   Subjective "     Chief Complaint:  Fever, arthralgia, chest pain, shortness of breath    HPI:  Kobi Gonzalez is a 60 y.o. male PMH MI with 1 stent (2018), COPD, hx of smoking (quit 2003), HTN, GERD, chronic low back pain, celiac disease, hemochromatosis, HLD presenting with fever, arthralgia, chest pain, shortness of breath that has been intermittent since June. Pt reports tick bite in May but work up was negative for Lyme's disease. He reports pneumonia in July (received oral antibiotics without improvement) and COVID in August. Pt states he continues to have intermittent episodes of fever, headache, dizziness, nausea, vomiting,  abd pain, rash, diarrhea, joint pain with swelling, shortness of breath, coughing. Denies decreased appetite and he is unsure if he has had any weight loss. Pt follows with cardiologist Dr Gamaliel Centeno in [facility]. Pt had heart cath last week at [facility] that was negative. He reports colonoscopy 2 years ago that was normal.     Review of Systems   Constitutional: Positive for activity change, fatigue and fever.   Eyes: Negative.    Respiratory: Positive for cough and shortness of breath.    Cardiovascular: Positive for chest pain and leg swelling.   Gastrointestinal: Positive for abdominal pain, diarrhea, nausea and vomiting.   Endocrine: Negative.    Genitourinary: Positive for flank pain. Negative for dysuria.   Musculoskeletal: Positive for arthralgias, back pain, joint swelling and myalgias. Negative for neck pain and neck stiffness.   Skin: Positive for rash.   Allergic/Immunologic: Negative.    Neurological: Positive for dizziness, weakness and headaches.   Hematological: Negative.    Psychiatric/Behavioral: Negative.      All other systems reviewed and are negative.     Personal History     Past Medical History:   Diagnosis Date   • Celiac disease    • GERD (gastroesophageal reflux disease)    • Hemochromatosis    • Hyperlipidemia    • Hypertension    • MI (myocardial  infarction) ([facility]) 2018    1 STENT       Past Surgical History:   Procedure Laterality Date   • NO PAST SURGERIES         Family History: family history includes Heart disease in his father and mother; Hypertension in his father and mother. Otherwise pertinent FHx was reviewed and unremarkable.     Social History:  reports that he quit smoking about 17 years ago. He does not have any smokeless tobacco history on file. He reports that he does not drink alcohol and does not use drugs.  Social History     Social History Narrative   • Not on file       Medications:    Available home medication information reviewed.  (Not in a hospital admission)      No Known Allergies    Objective   Objective     Vital Signs:   Temp:  [101.1 °F (38.4 °C)] 101.1 °F (38.4 °C)  Heart Rate:  [] 97  Resp:  [20] 20  BP: (128-146)/(76-88) 131/78        Physical Exam  Constitutional:       Appearance: He is obese. He is ill-appearing. He is not toxic-appearing.   HENT:      Head: Normocephalic and atraumatic.      Nose: Nose normal.   Eyes:      Conjunctiva/sclera: Conjunctivae normal.      Pupils: Pupils are equal, round, and reactive to light.   Cardiovascular:      Rate and Rhythm: Regular rhythm. Tachycardia present.      Pulses: Normal pulses.      Heart sounds: Normal heart sounds.   Pulmonary:      Effort: Respiratory distress present.      Breath sounds: Rhonchi and rales present. No wheezing.      Comments: Tachypneic  Chest:      Chest wall: No tenderness.   Abdominal:      General: There is distension.      Tenderness: There is abdominal tenderness. There is no guarding or rebound.   Musculoskeletal:         General: Swelling present.      Cervical back: Normal range of motion and neck supple. No rigidity.   Skin:     General: Skin is warm and dry.   Neurological:      General: No focal deficit present.      Mental Status: He is alert and oriented to person, place, and time.      Cranial Nerves: No cranial nerve deficit.       Motor: Weakness present.   Psychiatric:         Mood and Affect: Mood normal.         Behavior: Behavior normal.         Thought Content: Thought content normal.         Judgment: Judgment normal.        Results Reviewed:  I have personally reviewed current lab and radiology data.    Results from last 7 days   Lab Units 12/08/21  1318   WBC 10*3/mm3 15.87*   HEMOGLOBIN g/dL 14.6   HEMATOCRIT % 41.7   PLATELETS 10*3/mm3 144     Results from last 7 days   Lab Units 12/08/21  1627 12/08/21  1522 12/08/21  1318 12/08/21  1318   SODIUM mmol/L  --   --   --  132*   POTASSIUM mmol/L  --   --   --  4.3   CHLORIDE mmol/L  --   --   --  97*   CO2 mmol/L  --   --   --  24.0   BUN mg/dL  --   --   --  19   CREATININE mg/dL  --   --   --  1.09   GLUCOSE mg/dL  --   --   --  177*   CALCIUM mg/dL  --   --   --  9.3   ALT (SGPT) U/L  --   --   --  17   AST (SGOT) U/L  --   --   --  21   TROPONIN T ng/mL  --  <0.010  --  <0.010   PROBNP pg/mL  --   --   --  31.7   LACTATE mmol/L 1.7  --    < > 2.7*   PROCALCITONIN ng/mL  --   --   --  0.43*    < > = values in this interval not displayed.     Estimated Creatinine Clearance: 88.1 mL/min (by C-G formula based on SCr of 1.09 mg/dL).  Brief Urine Lab Results  (Last result in the past 365 days)      Color   Clarity   Blood   Leuk Est   Nitrite   Protein   CREAT   Urine HCG        12/08/21 1522 Yellow   Clear   Negative   Negative   Negative   Negative               Imaging Results (Last 24 Hours)     Procedure Component Value Units Date/Time    CT Abdomen Pelvis Without Contrast [[id number]] Resulted: 12/08/21 1802     Updated: 12/08/21 1806    CT Chest Pulmonary Embolism [[id number]] Collected: 12/08/21 1433     Updated: 12/08/21 1438    Narrative:      EXAMINATION: CT CHEST PULMONARY EMBOLISM-12/08/2021:      INDICATION: PE suspected, high prob, shortness of breath.     TECHNIQUE: Multiple axial CT imaging was obtained of the chest following  the administration of intravenous contrast.      The radiation dose reduction device was turned on for each scan per the  ALARA (As Low as Reasonably Achievable) protocol.     COMPARISON: NONE.     FINDINGS: The thyroid is homogeneous. No mediastinal mass or adenopathy.  The cardiac chambers are within normal limits. No pericardial effusion.  No bulky hilar or axillary lymphadenopathy. No filling defect in the  segmental or subsegmental arteries. Degenerative changes seen within the  spine. The visualized upper abdomen reveals some chronic changes and  scarring in the lung bases bilaterally. No superimposed infiltrate. No  pulmonary mass or nodule. Degenerative changes seen within the spine.       Impression:      No evidence of pulmonary embolism. No CT evidence of acute  intrathoracic abnormality. No superimposed infectious process.     D:  12/08/2021  E:  12/08/2021          XR Chest 1 View [[id number]] Collected: 12/08/21 1353     Updated: 12/08/21 1354    Narrative:      EXAMINATION: XR CHEST 1 VW-12/08/2021:      INDICATION: Chest Pain, triage protocol.      COMPARISON: NONE.     FINDINGS: Portable chest reveals cardiac and mediastinal silhouettes  within normal limits. The lung fields are clear. No focal parenchymal  opacification present.  No pleural effusion or pneumothorax. The bony  structures are unremarkable.       Impression:      No acute cardiopulmonary disease.     D:  12/08/2021  E:  12/08/2021                  Assessment/Plan   Assessment / Plan     Active Hospital Problems    Diagnosis  POA   • Sepsis, unspecified organism (HCC) [A41.9]  Unknown   • Tick bite of lower back [S30.860A, W57.XXXA]  Unknown   • Fever [R50.9]  Unknown   • Myalgia [M79.10]  Unknown   • Celiac disease [K90.0]  Unknown   • Hereditary hemochromatosis (HCC) [E83.110]  Unknown   • Coronary artery disease involving native coronary artery [I25.10]  Unknown   • Primary hypertension [I10]  Unknown   • Mixed hyperlipidemia [E78.2]  Unknown   • Simple chronic bronchitis (HCC)  [J41.0]  Unknown     Hospital Course:  Kobi Gonzalez is a 60 y.o. male PMH MI with 1 stent (2018), COPD, hx of smoking (quit 2003), HTN, GERD, chronic low back pain, celiac disease, hemochromatosis, HLD presenting with fever, arthralgia, chest pain, shortness of breath that has been intermittent since June.     Sepsis unknown origin  -Fluid Resuscitation  -Lactic acid on arrival 2.7, repeat 1.7  -elevated procal 0.43, WBC 15.87  -blood cultures, GI PCR, respiratory PCR  -Tick borne panel   -zosyn, vanco, doxy  -MRSA PCR  -COVID/Influenza neg   -NS 100ml/hr overnight  -CTA chest No PE or acute abnormality   -Cxray no acute issues  -CTabd/pelvis pending    Chest pain(appears pleuritic)  Hx MI with 1 stent (2018)  -troponin neg   -BNP normal  -ECHO  -EKG Sinus Tach Qt/Qtc 350/456    Elevated Glucose  -A1c 6.3    Hyponatremia  -Na+ 132, corrected 133     HTN/HLD  -Lisinopril    Hyperbilirubinemia  -total bili 2.3  -?Gilbert's syndrome    Obesity  -BMI 33.00    Hx Hemochromatosis    Celiac's disease  -Celiac diet    GERD  -Protonix    Chronic low back pain  -gabapentin on home med list but juan report #[id number] show no controlled substances.     DVT prophylaxis:  Heparin     CODE STATUS:    Code Status and Medical Interventions:   Ordered at: 12/08/21 7727     Level Of Support Discussed With:    Patient     Code Status (Patient has no pulse and is not breathing):    CPR (Attempt to Resuscitate)     Medical Interventions (Patient has pulse or is breathing):    Full Support     Admission Status:  I believe this patient meets INPATIENT status due to Sepsis POA with unclear source.  I feel patient’s risk for adverse outcomes and need for care warrant INPATIENT evaluation and I predict the patient’s care encounter to likely last beyond 2 midnights.    Electronically signed by OXANA Durant, 12/08/21, 5:19 PM EST.      Attending   Admission Attestation       I have seen and examined the patient, performing an  independent face-to-face diagnostic evaluation with plan of care reviewed and developed with the advanced practice clinician (APC).      Brief Summary Statement:   Kobi Gonzalez is a 60 y.o. male history of GERD, hemochromatosis, hyperlipidemia, hypertension, celiac disease, coronary artery disease with 1 stent, COPD, former smoker, hypertension, low back pain who presented to the ER with shortness of breath, chest pain, fevers, cough, chest congestion.    He has had chest pains for several months maybe even going back to the summer 2021    Precordial pain has been there since July 2021.  He also appears to meet sepsis criteria present on admission with unclear source of infection.  Temperature of 101.1 CT of the chest was done in the ER.  His T-max today was 103.8.  Covid and influenza testing was negative.  CT of the abdomen pelvis was done due to the fact that we do not have a localizing source of infection.  Cholelithiasis on CT.  Elevated Bilirubin.   US Gallbladder pending    Remainder of detailed HPI is as noted by APC and has been reviewed and/or edited by me for completeness.    Attending Physical Exam:    Constitutional: Awake, alert  Eyes: PERRLA, sclerae anicteric, no conjunctival injection  HENT: NCAT, mucous membranes moist  Neck: Supple, no JVD, trachea midline  Respiratory: Clear to auscultation bilaterally, nonlabored respirations   Cardiovascular: RRR, no murmurs, rubs, or gallops, palpable pedal pulses bilaterally  Gastrointestinal: Positive bowel sounds, soft, nontender, obese abdomen  Musculoskeletal: No bilateral ankle edema, no clubbing or cyanosis to extremities  Psychiatric: Appropriate affect, cooperative  Neurologic: Oriented x 3, strength symmetric in all extremities, Cranial Nerves grossly intact to confrontation, speech clear  Skin: dry, + skin, bronzed skin of suntan in face and neck    Brief Assessment/Plan :  See detailed assessment and plan developed with APC which I have reviewed  and/or edited for completeness.    Admission Status: I believe that this patient meets INPATIENT status due to Sepsis POA with unclear source of infection.  I feel patient’s risk for adverse outcomes and need for care warrant INPATIENT evaluation and I predict the patient’s care encounter to likely last beyond 2 midnights.        Robb James MD  12/08/21                Electronically signed by Robb James MD at 12/08/21 6961

## 2021-12-10 VITALS
BODY MASS INDEX: 34.09 KG/M2 | HEIGHT: 70 IN | WEIGHT: 238.1 LBS | HEART RATE: 77 BPM | DIASTOLIC BLOOD PRESSURE: 74 MMHG | OXYGEN SATURATION: 95 % | RESPIRATION RATE: 20 BRPM | SYSTOLIC BLOOD PRESSURE: 130 MMHG | TEMPERATURE: 98.7 F

## 2021-12-10 LAB
ALBUMIN SERPL-MCNC: 3.6 G/DL (ref 3.5–5.2)
ALBUMIN/GLOB SERPL: 1.1 G/DL
ALP SERPL-CCNC: 73 U/L (ref 39–117)
ALT SERPL W P-5'-P-CCNC: 15 U/L (ref 1–41)
ANION GAP SERPL CALCULATED.3IONS-SCNC: 7 MMOL/L (ref 5–15)
AST SERPL-CCNC: 16 U/L (ref 1–40)
B BURGDOR IGG+IGM SER-ACNC: <0.91 ISR (ref 0–0.9)
B BURGDOR IGM SER IA-ACNC: <0.8 INDEX (ref 0–0.79)
BASOPHILS # BLD AUTO: 0.03 10*3/MM3 (ref 0–0.2)
BASOPHILS NFR BLD AUTO: 0.4 % (ref 0–1.5)
BILIRUB SERPL-MCNC: 1.6 MG/DL (ref 0–1.2)
BUN SERPL-MCNC: 11 MG/DL (ref 6–20)
BUN/CREAT SERPL: 11.5 (ref 7–25)
CALCIUM SPEC-SCNC: 8.8 MG/DL (ref 8.6–10.5)
CHLORIDE SERPL-SCNC: 104 MMOL/L (ref 98–107)
CO2 SERPL-SCNC: 27 MMOL/L (ref 22–29)
CREAT SERPL-MCNC: 0.96 MG/DL (ref 0.76–1.27)
DEPRECATED RDW RBC AUTO: 47.4 FL (ref 37–54)
EOSINOPHIL # BLD AUTO: 0.56 10*3/MM3 (ref 0–0.4)
EOSINOPHIL NFR BLD AUTO: 7 % (ref 0.3–6.2)
ERYTHROCYTE [DISTWIDTH] IN BLOOD BY AUTOMATED COUNT: 15 % (ref 12.3–15.4)
FERRITIN SERPL-MCNC: 281.3 NG/ML (ref 30–400)
GFR SERPL CREATININE-BSD FRML MDRD: 80 ML/MIN/1.73
GLOBULIN UR ELPH-MCNC: 3.2 GM/DL
GLUCOSE SERPL-MCNC: 123 MG/DL (ref 65–99)
HCT VFR BLD AUTO: 41.1 % (ref 37.5–51)
HGB BLD-MCNC: 13.6 G/DL (ref 13–17.7)
HIV1+2 AB SER QL: NORMAL
IMM GRANULOCYTES # BLD AUTO: 0.02 10*3/MM3 (ref 0–0.05)
IMM GRANULOCYTES NFR BLD AUTO: 0.2 % (ref 0–0.5)
LYMPHOCYTES # BLD AUTO: 1.86 10*3/MM3 (ref 0.7–3.1)
LYMPHOCYTES NFR BLD AUTO: 23.1 % (ref 19.6–45.3)
MCH RBC QN AUTO: 28.5 PG (ref 26.6–33)
MCHC RBC AUTO-ENTMCNC: 33.1 G/DL (ref 31.5–35.7)
MCV RBC AUTO: 86.2 FL (ref 79–97)
MONOCYTES # BLD AUTO: 0.77 10*3/MM3 (ref 0.1–0.9)
MONOCYTES NFR BLD AUTO: 9.6 % (ref 5–12)
NEUTROPHILS NFR BLD AUTO: 4.81 10*3/MM3 (ref 1.7–7)
NEUTROPHILS NFR BLD AUTO: 59.7 % (ref 42.7–76)
NRBC BLD AUTO-RTO: 0 /100 WBC (ref 0–0.2)
PLATELET # BLD AUTO: 89 10*3/MM3 (ref 140–450)
PMV BLD AUTO: 12.6 FL (ref 6–12)
POTASSIUM SERPL-SCNC: 4 MMOL/L (ref 3.5–5.2)
PROT SERPL-MCNC: 6.8 G/DL (ref 6–8.5)
R RICKETTSI IGM SER-ACNC: 0.53 INDEX (ref 0–0.89)
RBC # BLD AUTO: 4.77 10*6/MM3 (ref 4.14–5.8)
SODIUM SERPL-SCNC: 138 MMOL/L (ref 136–145)
VANCOMYCIN TROUGH SERPL-MCNC: 7.3 MCG/ML (ref 5–20)
WBC NRBC COR # BLD: 8.05 10*3/MM3 (ref 3.4–10.8)

## 2021-12-10 PROCEDURE — 87798 DETECT AGENT NOS DNA AMP: CPT | Performed by: INTERNAL MEDICINE

## 2021-12-10 PROCEDURE — 87305 ASPERGILLUS AG IA: CPT | Performed by: INTERNAL MEDICINE

## 2021-12-10 PROCEDURE — 80202 ASSAY OF VANCOMYCIN: CPT

## 2021-12-10 PROCEDURE — 25010000002 HEPARIN (PORCINE) PER 1000 UNITS: Performed by: NURSE PRACTITIONER

## 2021-12-10 PROCEDURE — 85025 COMPLETE CBC W/AUTO DIFF WBC: CPT | Performed by: INTERNAL MEDICINE

## 2021-12-10 PROCEDURE — G0432 EIA HIV-1/HIV-2 SCREEN: HCPCS | Performed by: INTERNAL MEDICINE

## 2021-12-10 PROCEDURE — 99239 HOSP IP/OBS DSCHRG MGMT >30: CPT | Performed by: INTERNAL MEDICINE

## 2021-12-10 PROCEDURE — 86622 BRUCELLA ANTIBODY: CPT | Performed by: INTERNAL MEDICINE

## 2021-12-10 PROCEDURE — 25010000002 PIPERACILLIN SOD-TAZOBACTAM PER 1 G: Performed by: NURSE PRACTITIONER

## 2021-12-10 PROCEDURE — 86611 BARTONELLA ANTIBODY: CPT | Performed by: INTERNAL MEDICINE

## 2021-12-10 PROCEDURE — 80053 COMPREHEN METABOLIC PANEL: CPT | Performed by: INTERNAL MEDICINE

## 2021-12-10 PROCEDURE — 87799 DETECT AGENT NOS DNA QUANT: CPT | Performed by: INTERNAL MEDICINE

## 2021-12-10 PROCEDURE — 86638 Q FEVER ANTIBODY: CPT | Performed by: INTERNAL MEDICINE

## 2021-12-10 PROCEDURE — 25010000002 VANCOMYCIN 10 G RECONSTITUTED SOLUTION

## 2021-12-10 PROCEDURE — 82728 ASSAY OF FERRITIN: CPT | Performed by: INTERNAL MEDICINE

## 2021-12-10 PROCEDURE — 86780 TREPONEMA PALLIDUM: CPT | Performed by: INTERNAL MEDICINE

## 2021-12-10 PROCEDURE — 87449 NOS EACH ORGANISM AG IA: CPT | Performed by: INTERNAL MEDICINE

## 2021-12-10 PROCEDURE — 87385 HISTOPLASMA CAPSUL AG IA: CPT | Performed by: INTERNAL MEDICINE

## 2021-12-10 RX ORDER — DOXYCYCLINE 100 MG/1
100 CAPSULE ORAL EVERY 12 HOURS SCHEDULED
Qty: 28 CAPSULE | Refills: 0 | Status: SHIPPED | OUTPATIENT
Start: 2021-12-10 | End: 2021-12-24

## 2021-12-10 RX ADMIN — VANCOMYCIN HYDROCHLORIDE 1500 MG: 10 INJECTION, POWDER, LYOPHILIZED, FOR SOLUTION INTRAVENOUS at 08:45

## 2021-12-10 RX ADMIN — HEPARIN SODIUM 5000 UNITS: 5000 INJECTION INTRAVENOUS; SUBCUTANEOUS at 06:49

## 2021-12-10 RX ADMIN — SODIUM CHLORIDE, PRESERVATIVE FREE 10 ML: 5 INJECTION INTRAVENOUS at 08:41

## 2021-12-10 RX ADMIN — PANTOPRAZOLE SODIUM 40 MG: 40 TABLET, DELAYED RELEASE ORAL at 06:49

## 2021-12-10 RX ADMIN — TAZOBACTAM SODIUM AND PIPERACILLIN SODIUM 3.38 G: 375; 3 INJECTION, SOLUTION INTRAVENOUS at 12:03

## 2021-12-10 RX ADMIN — LISINOPRIL 5 MG: 5 TABLET ORAL at 08:36

## 2021-12-10 RX ADMIN — TAZOBACTAM SODIUM AND PIPERACILLIN SODIUM 3.38 G: 375; 3 INJECTION, SOLUTION INTRAVENOUS at 00:25

## 2021-12-10 RX ADMIN — DOXYCYCLINE 100 MG: 100 CAPSULE ORAL at 06:49

## 2021-12-10 NOTE — PROGRESS NOTES
Pharmacy Consult-Vancomycin Dosing    Kobi Gonzalez is a  60 y.o. male receiving vancomycin therapy.    Indication: Sepsis  Consulting Provider: Hospitalist  ID Consult: No    Goal AUC: 400-600 mg/L*hr    Current Antimicrobial Therapy    Anti-Infectives (From admission, onward)      Ordered     Dose/Rate Route Frequency Start Stop    12/10/21 0800  vancomycin 1500 mg/500 mL 0.9% NS IVPB ([facility])        Ordering Provider: Seth Mesa, PharmD    1,500 mg Intravenous Every 12 Hours 12/10/21 0900 12/15/21 0859    12/08/21 2046  piperacillin-tazobactam (ZOSYN) 3.375 g in iso-osmotic dextrose 50 ml (premix)        Ordering Provider: Imelda [place] APRN    3.375 g  over 4 Hours Intravenous Every 8 Hours 12/09/21 0000 12/15/21 2359    12/08/21 2046  Pharmacy to dose vancomycin        Ordering Provider: Imelda [place] APRN     Does not apply Continuous PRN 12/08/21 2046 12/15/21 2045    12/08/21 1735  doxycycline (MONODOX) capsule 100 mg        Note to Pharmacy: Tick borne illness   Ordering Provider: Imelda [place] APRN    100 mg Oral Every 12 Hours Scheduled 12/08/21 1737 12/15/21 1759    12/08/21 1652  vancomycin 2000 mg/500 mL 0.9% NS IVPB ([facility])        Ordering Provider: Jonh Martinez MD    20 mg/kg × 104 kg Intravenous Once 12/08/21 1654 12/08/21 2008    12/08/21 1638  piperacillin-tazobactam (ZOSYN) 4.5 g in iso-osmotic dextrose 100 mL IVPB (premix)        Ordering Provider: Jonh Martinez MD    4.5 g Intravenous Once 12/08/21 1640 12/08/21 1741          Allergies  Allergies as of 12/08/2021    (No Known Allergies)     Labs  Results from last 7 days   Lab Units 12/10/21  0659 12/09/21  0717 12/08/21  1318   BUN mg/dL 11 15 19   CREATININE mg/dL 0.96 1.03 1.09     Results from last 7 days   Lab Units 12/10/21  0659 12/09/21  0931 12/08/21  1318   WBC 10*3/mm3 8.05 10.65 15.87*     Evaluation of Dosing    Last Dose Received in the ED/Outside Facility: Vancomcyin 2000 mg IV x 1 12/8 @ 1743  Is Patient  "on Dialysis or Renal Replacement: No    Ht - 177.8 cm (70\")  Wt - 108 kg (238 lb 1.6 oz)    Estimated Creatinine Clearance: 102 mL/min (by C-G formula based on SCr of 0.96 mg/dL).    Intake & Output (last 3 days)         12/07 0701 12/08 0700 12/08 0701 12/09 0700 12/09 0701  12/10 0700 12/10 0701 12/11 0700    P.O.   820     IV Piggyback  2000      Total Intake(mL/kg)  2000 (18.5) 820 (7.6)     Net  +2000 +820             Urine Unmeasured Occurrence   3 x     Stool Unmeasured Occurrence   1 x           Microbiology and Radiology  Microbiology Results (last 10 days)       Procedure Component Value - Date/Time    Gastrointestinal Panel, PCR - Stool, Per Rectum [[id number]]  (Normal) Collected: 12/09/21 1924    Lab Status: Final result Specimen: Stool from Per Rectum Updated: 12/09/21 2059     Campylobacter Not Detected     Plesiomonas shigelloides Not Detected     Salmonella Not Detected     Vibrio Not Detected     Vibrio cholerae Not Detected     Yersinia enterocolitica Not Detected     Enteroaggregative E. coli (EAEC) Not Detected     Enteropathogenic E. coli (EPEC) Not Detected     Enterotoxigenic E. coli (ETEC) lt/st Not Detected     Shiga-like toxin-producing E. coli (STEC) stx1/stx2 Not Detected     Shigella/Enteroinvasive E. coli (EIEC) Not Detected     Cryptosporidium Not Detected     Cyclospora cayetanensis Not Detected     Entamoeba histolytica Not Detected     Giardia lamblia Not Detected     Adenovirus F40/41 Not Detected     Astrovirus Not Detected     Norovirus GI/GII Not Detected     Rotavirus A Not Detected     Sapovirus (I, II, IV or V) Not Detected    Respiratory Culture - Sputum, Cough [[id number]] Collected: 12/09/21 1159    Lab Status: Preliminary result Specimen: Sputum from Cough Updated: 12/09/21 1339     Gram Stain Many (4+) Epithelial cells per low power field      Moderate (3+) WBCs per low power field      Many (4+) Mixed bacterial morphotypes seen on Gram Stain    Respiratory Panel, PCR " (WITHOUT COVID) - Swab, Nasopharynx [[id number]]  (Normal) Collected: 12/09/21 1159    Lab Status: Final result Specimen: Swab from Nasopharynx Updated: 12/09/21 1319     ADENOVIRUS, PCR Not Detected     Coronavirus 229E Not Detected     Coronavirus HKU1 Not Detected     Coronavirus NL63 Not Detected     Coronavirus OC43 Not Detected     Human Metapneumovirus Not Detected     Human Rhinovirus/Enterovirus Not Detected     Influenza B PCR Not Detected     Parainfluenza Virus 1 Not Detected     Parainfluenza Virus 2 Not Detected     Parainfluenza Virus 3 Not Detected     Parainfluenza Virus 4 Not Detected     Bordetella pertussis pcr Not Detected     Chlamydophila pneumoniae PCR Not Detected     Mycoplasma pneumo by PCR Not Detected     Influenza A PCR Not Detected     RSV, PCR Not Detected     Bordetella parapertussis PCR Not Detected    Narrative:      The coronavirus on the RVP is NOT COVID-19 and is NOT indicative of infection with COVID-19.    In the setting of a positive respiratory panel with a viral infection PLUS a negative procalcitonin without other underlying concern for bacterial infection, consider observing off antibiotics or discontinuation of antibiotics and continue supportive care. If the respiratory panel is positive for atypical bacterial infection (Bordetella pertussis, Chlamydophila pneumoniae, or Mycoplasma pneumoniae), consider antibiotic de-escalation to target atypical bacterial infection.    Blood Culture - Blood, Arm, Right [[id number]]  (Normal) Collected: 12/08/21 1422    Lab Status: Preliminary result Specimen: Blood from Arm, Right Updated: 12/09/21 1445     Blood Culture No growth at 24 hours    Blood Culture - Blood, Arm, Right [[id number]]  (Normal) Collected: 12/08/21 1410    Lab Status: Preliminary result Specimen: Blood from Arm, Right Updated: 12/09/21 1445     Blood Culture No growth at 24 hours    COVID PRE-OP / PRE-PROCEDURE SCREENING ORDER (NO ISOLATION) - Swab, Nasopharynx  [[id number]]  (Normal) Collected: 12/08/21 1337    Lab Status: Final result Specimen: Swab from Nasopharynx Updated: 12/08/21 1409    Narrative:      The following orders were created for panel order COVID PRE-OP / PRE-PROCEDURE SCREENING ORDER (NO ISOLATION) - Swab, Nasopharynx.  Procedure                               Abnormality         Status                     ---------                               -----------         ------                     COVID-19 and FLU A/B PCR...[[id number]]  Normal              Final result                 Please view results for these tests on the individual orders.    COVID-19 and FLU A/B PCR - Swab, Nasopharynx [[id number]]  (Normal) Collected: 12/08/21 1337    Lab Status: Final result Specimen: Swab from Nasopharynx Updated: 12/08/21 1409     COVID19 Not Detected     Influenza A PCR Not Detected     Influenza B PCR Not Detected    Narrative:      Fact sheet for providers: https://www.fda.gov/media/823846/download    Fact sheet for patients: https://www.fda.gov/media/929726/download    Test performed by PCR.          Reported Vancomycin Levels          Results from last 7 days   Lab Units 12/10/21  0659   VANCOMYCIN TR mcg/mL 7.30     [employer] AUC Calculation:    Current AUC: 315 mg/L*hr    Predicted Steady State AUC on Current Dose: 336 mg/L*hr  _________________________________    Predicted Steady State AUC on New Dose: 486 mg/L*hr    Assessment/Plan:    Pharmacy to dose vancomycin for sepsis.  Initiated on vancomycin 2000 mg IV x 1 followed by 1000 mg IV every 12 hours.  Vancomycin level 12/10 correlates with subtherapeutic AUC, increase to Vancomycin 1500mg IV q12h.  Monitor renal function, cultures and sensitivities, and clinical status, and adjust regimen as necessary.  Pharmacy will continue to follow.    Seth Mesa, PharmD  12/10/2021  08:01 EST

## 2021-12-10 NOTE — PLAN OF CARE
Problem: Adult Inpatient Plan of Care  Goal: Plan of Care Review  Outcome: Met  Flowsheets  Taken 12/10/2021 1507 by Camryn Gutierrez RN  Plan of Care Reviewed With:   patient   spouse  Taken 12/9/2021 1603 by Roshni Funez PT  Progress: improving     Problem: Adult Inpatient Plan of Care  Goal: Patient-Specific Goal (Individualized)  Outcome: Met     Problem: Adult Inpatient Plan of Care  Goal: Absence of Hospital-Acquired Illness or Injury  Outcome: Met     Problem: Adult Inpatient Plan of Care  Goal: Absence of Hospital-Acquired Illness or Injury  Intervention: Identify and Manage Fall Risk  Recent Flowsheet Documentation  Taken 12/10/2021 1400 by Camryn Gutierrez RN  Safety Promotion/Fall Prevention:   assistive device/personal items within reach   clutter free environment maintained   lighting adjusted   nonskid shoes/slippers when out of bed   safety round/check completed  Taken 12/10/2021 1200 by Camryn Gutierrez RN  Safety Promotion/Fall Prevention:   activity supervised   clutter free environment maintained   fall prevention program maintained   nonskid shoes/slippers when out of bed   safety round/check completed  Taken 12/10/2021 1000 by Camryn Gutierrez RN  Safety Promotion/Fall Prevention:   activity supervised   assistive device/personal items within reach   clutter free environment maintained   fall prevention program maintained   nonskid shoes/slippers when out of bed   safety round/check completed  Taken 12/10/2021 0845 by Camryn Gutierrez RN  Safety Promotion/Fall Prevention:   assistive device/personal items within reach   clutter free environment maintained   fall prevention program maintained   nonskid shoes/slippers when out of bed   safety round/check completed     Problem: Adult Inpatient Plan of Care  Goal: Absence of Hospital-Acquired Illness or Injury  Intervention: Prevent Skin Injury  Recent Flowsheet Documentation  Taken 12/10/2021 1400 by Camryn Gutierrez RN  Body Position: position  changed independently  Taken 12/10/2021 1200 by Camryn Gutierrez RN  Body Position: position changed independently  Taken 12/10/2021 1000 by Camryn Gutierrez RN  Body Position: position changed independently  Taken 12/10/2021 0845 by Camryn Gutierrez RN  Body Position: position changed independently     Problem: Adult Inpatient Plan of Care  Goal: Absence of Hospital-Acquired Illness or Injury  Intervention: Prevent Infection  Recent Flowsheet Documentation  Taken 12/10/2021 1400 by Camryn Gutierrez RN  Infection Prevention:   environmental surveillance performed   hand hygiene promoted   rest/sleep promoted   single patient room provided  Taken 12/10/2021 1200 by Camryn Gutierrez RN  Infection Prevention:   environmental surveillance performed   hand hygiene promoted   rest/sleep promoted   single patient room provided  Taken 12/10/2021 0845 by Camryn Gutierrez RN  Infection Prevention:   environmental surveillance performed   hand hygiene promoted   rest/sleep promoted   single patient room provided     Problem: Adult Inpatient Plan of Care  Goal: Optimal Comfort and Wellbeing  Outcome: Met     Problem: Adult Inpatient Plan of Care  Goal: Readiness for Transition of Care  Outcome: Met     Problem: Hypertension Comorbidity  Goal: Blood Pressure in Desired Range  Outcome: Met     Problem: Hypertension Comorbidity  Goal: Blood Pressure in Desired Range  Intervention: Maintain Hypertension-Management Strategies  Recent Flowsheet Documentation  Taken 12/10/2021 1400 by Camryn Gutierrez RN  Medication Review/Management: medications reviewed  Taken 12/10/2021 1200 by Camryn Gutierrez RN  Medication Review/Management: medications reviewed  Taken 12/10/2021 0845 by Camryn Gutierrez RN  Medication Review/Management: medications reviewed       Problem: Pain Chronic (Persistent) (Comorbidity Management)  Goal: Acceptable Pain Control and Functional Ability  Outcome: Met     Problem: Pain Chronic (Persistent) (Comorbidity  Management)  Goal: Acceptable Pain Control and Functional Ability  Intervention: Manage Persistent Pain  Recent Flowsheet Documentation  Taken 12/10/2021 1400 by Camryn Gutierrez, RN  Medication Review/Management: medications reviewed  Taken 12/10/2021 1200 by Camryn Gutierrez, RN  Medication Review/Management: medications reviewed  Taken 12/10/2021 0845 by Camryn Gutierrez, RN  Medication Review/Management: medications reviewed      Goal Outcome Evaluation:  Plan of Care Reviewed With: patient, spouse

## 2021-12-10 NOTE — CONSULTS
Infectious Disease Consult  Kobi Gonzalez  1960  [id number]    Date of Consult: 12/10/2021  Admission Date: 12/8/2021    Requesting Provider: Dr Vasquez  Evaluating Physician: Marcos Kent MD    Chief Complaint: fever    Reason for Consultation: fever    History of present illness:   Patient is a 60 y.o.  Yr old male with history of celiac disease, GERD, hemochromatosis, MI. Presented to [facility] on 12/8 with complaints of fever. Patient complains of intermittent fevers which started in June after traveling to [place]. He said he occasionally has fevers up to 103 or even 105 at home.  The symptoms typically last 2-3 days and then resolve of her own with or without antibiotics.  Has been admitted a few times to hospital in [place] for workup.  He said workup is usually negative and he is discharged on a few days of oral antibiotics.  He has never had a prolonged course of IV antibiotics.  He said he had a tick bite in May but was screened for Lyme disease previously and was negative.  Had Covid in August.  He said fever symptoms are occasionally a copy by erythematous rash and joint pain.  Intermittent complaints of diarrhea but says he has also been on stool softeners.  Denies drug use.  Sexually active only with his wife.  No history of international travel.  Works as a .  No known tuberculosis exposures    Temp of 101.1 on arrival, then no fever since. WBC 15 with 91% neutrophils, now improved. Started on vancomycin, zosyn and doxycycline. Blood cx negative. RVP and GI panel negative. Patient now feels entirely back to normal and is hopeful for discharge    Review of Systems  Constitutional-- See HPI.   Heent-- No new vision, hearing or throat complaints.  No epistaxis or oral sores.  Denies odynophagia or dysphagia.  No headache, photophobia.   CV-- No chest pain, palpitation or syncope  Resp-- No SOB/cough/Hemoptysis  GI- No nausea, vomiting   -- No dysuria, hematuria, or flank pain.   Denies hesitancy, urgency or flank pain.  Lymph- no swollen lymph nodes in neck/axilla or groin.   Heme- No active bruising or bleeding  MS-- no swelling or pain in the bones or joints of arms/legs.  No new back pain.  Neuro-- Chronic headache, unchanged.  Treated to arthritis in his neck.  Skin-- No rashes, lesions, ulcers. No skin breakdown      Past Medical History:   Diagnosis Date   • Celiac disease    • GERD (gastroesophageal reflux disease)    • Hemochromatosis    • Hyperlipidemia    • Hypertension    • MI (myocardial infarction) (HCC) 2018    1 STENT       Past Surgical History:   Procedure Laterality Date   • NO PAST SURGERIES         Social History     Socioeconomic History   • Marital status:    Tobacco Use   • Smoking status: Former Smoker     Quit date: 2004     Years since quittin.0   Substance and Sexual Activity   • Alcohol use: No   • Drug use: No   • Sexual activity: Defer       family history includes Heart disease in his father and mother; Hypertension in his father and mother.    No Known Allergies    Antibiotics:  Anti-Infectives (From admission, onward)    Ordered     Dose/Rate Route Frequency Start Stop    12/10/21 0800  vancomycin 1500 mg/500 mL 0.9% NS IVPB ([facility])        Ordering Provider: Seth Mesa, PharmD    1,500 mg Intravenous Every 12 Hours 12/10/21 0900 12/15/21 0859    21  piperacillin-tazobactam (ZOSYN) 3.375 g in iso-osmotic dextrose 50 ml (premix)        Ordering Provider: Imelda Lynn APRN    3.375 g  over 4 Hours Intravenous Every 8 Hours 21 0000 12/15/21 2359    21 204  Pharmacy to dose vancomycin        Ordering Provider: Imelda Lynn APRN     Does not apply Continuous PRN 21 2046 12/15/21 2045    21 1735  doxycycline (MONODOX) capsule 100 mg        Note to Pharmacy: Tick borne illness   Ordering Provider: Imelda Lynn APRN    100 mg Oral Every 12 Hours Scheduled 21 1737 12/15/21 1757     12/08/21 1652  vancomycin 2000 mg/500 mL 0.9% NS IVPB ([facility])        Ordering Provider: Jonh Martinez MD    20 mg/kg × 104 kg Intravenous Once 12/08/21 1654 12/08/21 2008 12/08/21 1638  piperacillin-tazobactam (ZOSYN) 4.5 g in iso-osmotic dextrose 100 mL IVPB (premix)        Ordering Provider: Jonh Martinez MD    4.5 g Intravenous Once 12/08/21 1640 12/08/21 1741          Other Medications:  Current Facility-Administered Medications   Medication Dose Route Frequency Provider Last Rate Last Admin   • acetaminophen (TYLENOL) tablet 650 mg  650 mg Oral Q4H PRN Imelda Lynn APRN   650 mg at 12/09/21 2241    Or   • acetaminophen (TYLENOL) 160 MG/5ML solution 650 mg  650 mg Oral Q4H PRN Imelda Lynn APRN        Or   • acetaminophen (TYLENOL) suppository 650 mg  650 mg Rectal Q4H PRN Imelda Lynn APRN       • sennosides-docusate (PERICOLACE) 8.6-50 MG per tablet 2 tablet  2 tablet Oral BID Imelda Lynn APRN   2 tablet at 12/08/21 2349    And   • polyethylene glycol (MIRALAX) packet 17 g  17 g Oral Daily PRN Imelda Lynn APRN        And   • bisacodyl (DULCOLAX) EC tablet 5 mg  5 mg Oral Daily PRN Imelda Lynn APRN        And   • bisacodyl (DULCOLAX) suppository 10 mg  10 mg Rectal Daily PRN Imelda Lynn APRN       • doxycycline (MONODOX) capsule 100 mg  100 mg Oral Q12H Imelda Lynn APRN   100 mg at 12/10/21 0649   • heparin (porcine) 5000 UNIT/ML injection 5,000 Units  5,000 Units Subcutaneous Q8H Imelda Lynn APRN   5,000 Units at 12/10/21 0649   • ipratropium-albuterol (DUO-NEB) nebulizer solution 3 mL  3 mL Nebulization Q4H PRN Imelda Lynn APRN       • lisinopril (PRINIVIL,ZESTRIL) tablet 5 mg  5 mg Oral Q24H Imelda Lynn APRN   5 mg at 12/10/21 0836   • melatonin tablet 5 mg  5 mg Oral Nightly PRN Imelda Lynn APRN   5 mg at 12/08/21 4997   • ondansetron (ZOFRAN) injection 4 mg  4 mg Intravenous Q6H PRN Imelda Lynn APRN   4 mg at 12/08/21 6690  "  • pantoprazole (PROTONIX) EC tablet 40 mg  40 mg Oral Q AM Imelda Lynn APRN   40 mg at 12/10/21 0649   • Pharmacy to dose vancomycin   Does not apply Continuous PRN Imelda Lynn APRN       • piperacillin-tazobactam (ZOSYN) 3.375 g in iso-osmotic dextrose 50 ml (premix)  3.375 g Intravenous Q8H Imelda Lynn APRN   3.375 g at 12/10/21 0025   • sodium chloride 0.9 % flush 10 mL  10 mL Intravenous PRN John Mirza MD       • sodium chloride 0.9 % flush 10 mL  10 mL Intravenous Q12H Imelda Lynn APRN   10 mL at 12/10/21 0841   • sodium chloride 0.9 % flush 10 mL  10 mL Intravenous PRN Imelda Lynn APRN       • vancomycin 1500 mg/500 mL 0.9% NS IVPB ([facility])  1,500 mg Intravenous Q12H Seth Mesa, PharmD   1,500 mg at 12/10/21 0845       Physical Exam:   Vital Signs   /82 (BP Location: Right arm, Patient Position: Lying)   Pulse 93   Temp 98.2 °F (36.8 °C) (Oral)   Resp 18   Ht 177.8 cm (70\")   Wt 108 kg (238 lb 1.6 oz)   SpO2 91%   BMI 34.16 kg/m²     GENERAL: Awake and alert, in no acute distress.   HEENT: Normocephalic, atraumatic.  PERRL. EOMI. No conjunctival injection. No icterus. Oropharynx clear without evidence of thrush or exudate.   NECK: Supple without nuchal rigidity. No meningismus   LYMPH: No cervical, axillary lymphadenopathy.  HEART: RRR; No murmur.  LUNGS: Clear to auscultation bilaterally without wheezing, rales, rhonchi. Normal respiratory effort. Nonlabored.  ABDOMEN: Soft, nontender, nondistended.  No rebound or guarding. No CVA tenderness  EXT:  No  edema.  : Without Tabor catheter.  MSK: FROM without joint effusions noted arms/legs.    SKIN: Warm and dry without cutaneous eruptions on Inspection/palpation.    NEURO: Oriented to PPT. No focal deficits on motor/sensory exam at arms/legs.  PSYCHIATRIC: Normal insight and judgement. Cooperative with PE    Laboratory Data    Results from last 7 days   Lab Units 12/10/21  0659 12/09/21  0931 " 12/08/21  1318   WBC 10*3/mm3 8.05 10.65 15.87*   HEMOGLOBIN g/dL 13.6 12.7* 14.6   HEMATOCRIT % 41.1 37.8 41.7   PLATELETS 10*3/mm3 89* 101* 144     Results from last 7 days   Lab Units 12/10/21  0659   SODIUM mmol/L 138   POTASSIUM mmol/L 4.0   CHLORIDE mmol/L 104   CO2 mmol/L 27.0   BUN mg/dL 11   CREATININE mg/dL 0.96   GLUCOSE mg/dL 123*   CALCIUM mg/dL 8.8     Estimated Creatinine Clearance: 102 mL/min (by C-G formula based on SCr of 0.96 mg/dL).  Results from last 7 days   Lab Units 12/10/21  0659   ALK PHOS U/L 73   BILIRUBIN mg/dL 1.6*   ALT (SGPT) U/L 15   AST (SGOT) U/L 16     Results from last 7 days   Lab Units 12/09/21  0931   SED RATE mm/hr 14     Results from last 7 days   Lab Units 12/08/21  1522   CRP mg/dL 0.73*       Microbiology:     Blood Culture   Date Value Ref Range Status   12/08/2021 No growth at 24 hours  Preliminary   12/08/2021 No growth at 24 hours  Preliminary      Respiratory Culture   Date Value Ref Range Status   12/09/2021   Preliminary    Moderate growth (3+) The culture consists of normal respiratory latisha. This is a preliminary report; final report to follow.     Respiratory viral panel negative  GI PCR negative    Radiology:  CT Abdomen Pelvis Without Contrast    Result Date: 12/8/2021   No acute abdominopelvic findings. No findings to explain left flank pain. Nonobstructing renal stone in the right superior pole. Cholelithiasis without CT evidence of cholecystitis.   This report was finalized on 12/8/2021 7:39 PM by Roney Rodríguez MD.      US Gallbladder    Result Date: 12/9/2021  Gallstones without wall thickening. No biliary ductal dilatation.  D:  12/09/2021 E:  12/09/2021         XR Chest 1 View    Result Date: 12/8/2021  No acute cardiopulmonary disease.  D:  12/08/2021 E:  12/08/2021       CT Chest Pulmonary Embolism    Result Date: 12/8/2021  No evidence of pulmonary embolism. No CT evidence of acute intrathoracic abnormality. No superimposed infectious process.  D:   12/08/2021 E:  12/08/2021        I personally reviewed the above CT: no evidence of pneumonia    TTE negative for endocarditis    Impression:   1. Recurrent high fever: Unclear cause.  Improved quickly with antibiotics suggesting possible bacterial etiology although no clear source found on workup to date.  Blood cultures pending and thus far negative.  No history of drug use.  Echo negative for endocarditis.  No concerning out abilities on CT chest abdomen pelvis.  No evidence of lymphadenopathy suggest cancer. Respiratory viral panel negative.  GI panel negative.  Had some elevated bilirubin on arrival but now evidently some right upper quadrant ultrasound. Some concern for autoimmune disease even stills with intermittent joint pain and reported rash however normal inflammatory markers and ferritin effectively rules this out.  2. Leukocytosis: on admission. Now resolved  3. Thrombocytopenia  4. Gallstones without wall thickening or ductal dilation on US    PLAN:    - f/u pending blood cultures   - f/u pending RMSF, ehrlichia, lyme, babesia tests  - check HIV, syphilis  - check strongyloides, whipples PCR  - quantiferon  - fungal workup: histo, blasto, aspergillus fungitell.   - EBV, CMV PCR  - bartonella, brucella, coxiella serologies    - stop vancomycin and zosyn  - continue doxycycline 14 day empiric course.     Patient feels well today, back to normal. Would like to discharge home possible.  Discussed with Dr. Vasquez.  He is currently nontoxic.  All of these send out tests can be followed up as an outpatient.  I will see him back in clinic on 12/16 to follow up these results. Ok to discharge today    Marcos Kent MD  12/10/2021

## 2021-12-10 NOTE — NURSING NOTE
Discharge education provided to patient and wife regarding discharge medication list and future appointments. Patient and wife voiced understanding of discharge education. Awaiting transport.

## 2021-12-10 NOTE — CASE MANAGEMENT/SOCIAL WORK
Continued Stay Note  [facility]     Patient Name: Kobi Gonzalez  MRN: [id number]  Today's Date: 12/10/2021    Admit Date: 12/8/2021     Discharge Plan     Row Name 12/10/21 1449       Plan    Plan home    Patient/Family in Agreement with Plan yes    Plan Comments I met with Mr. Gonzalez at the bedside. Mr. Gonzalez anticpates being discharged today. He will be leaving on oral antibiotics and denies having any additional discharge needs.    Final Discharge Disposition Code 01 - home or self-care               Discharge Codes    No documentation.               Expected Discharge Date and Time     Expected Discharge Date Expected Discharge Time    Dec 10, 2021             Gabriele Franco RN

## 2021-12-10 NOTE — DISCHARGE SUMMARY
[facility] Medicine Services  DISCHARGE SUMMARY    Patient Name: Kobi Gonzalez  : 1960  MRN: [id number]    Date of Admission: 2021  1:25 PM  Date of Discharge:  12/10/2021  Primary Care Physician: Leila Kolb APRN    Consults     Date and Time Order Name Status Description    2021 11:55 AM Inpatient Infectious Diseases Consult Completed           Hospital Course     Presenting Problem:   Febrile illness [R50.9]    Active Hospital Problems    Diagnosis  POA   • Sepsis, unspecified organism (HCC) [A41.9]  Unknown   • Tick bite of lower back [S30.860A, W57.XXXA]  Unknown   • Fever [R50.9]  Unknown   • Myalgia [M79.10]  Unknown   • Celiac disease [K90.0]  Unknown   • Hereditary hemochromatosis (HCC) [E83.110]  Unknown   • Coronary artery disease involving native coronary artery [I25.10]  Unknown   • Primary hypertension [I10]  Unknown   • Mixed hyperlipidemia [E78.2]  Unknown   • Simple chronic bronchitis (HCC) [J41.0]  Unknown   • Febrile illness [R50.9]  Yes      Resolved Hospital Problems   No resolved problems to display.          Hospital Course:  Kobi Gonzalez is a 60 y.o. male with PMH of CAD s/p stent (), COPD, hx of smoking (quit ), HTN, GERD, chronic low back pain, celiac disease, hemochromatosis, HLD presented with fever, arthralgia, chest pain, and shortness of breath. Fevers, chest pain  have been intermittent since  and pt had a LHC at [facility] (Dr. Dallas Centeno) one week prior to this admission which showed no obstructive disease.  Pt was found to be febrile with leukocytosis upon admission.        Sepsis unknown origin (fever, leukocytosis)  -Fluid Resuscitation  -Lactic acid on arrival 2.7, repeat was wnl  -elevated procal 0.43, WBC 15.87  -blood cultures NGTD, GI PCR and respiratory PCR negative  -Tick borne panel PENDING- of note, pt had tick bite over the summer and did have tick borne workup at that time which was negative.    -zosyn, vanco, doxy started upon admission  -COVID/Influenza negative  -- all imaging thus far unremarkable  --GB U/S negative for acute cholecystitis  -- consulted ID, plan is for follow up with Dr. Marcos Kent in clinic next week.  Pt to continue with PO Doxycycline for two weeks for time being  -- ferritin normal, CRP only mildly elevated.     Chest pain(appears pleuritic)  CAD s/p stent in 2018  -troponin neg   -BNP normal  -ECHO unremarkable  -EKG Sinus Tach Qt/Qtc 350/456, no ST changes  -- as mentioned, pt had LHC last week with Dr. Centeno which was reportedly unremarkable     Elevated Glucose  -- A1c 6.3     Hyponatremia  -Na+ 132 on admission  -- asymptomatic, not clinically significant     HTN/HLD  -Lisinopril     Hyperbilirubinemia  -total bili 2.3, now improved to 1.5  -GB U/S unremarkable     Obesity  -BMI 33.00  -- complicates all aspects of care     Hx Hemochromatosis     Celiac's disease  -Celiac diet     GERD  -Protonix     Chronic low back pain  -gabapentin on home med list but juan report #[id number] show no controlled substances.          Discharge Follow Up Recommendations for outpatient labs/diagnostics:  Follow up with PCP within one week  Follow up with Dr. Kent in ID clinic next week    Day of Discharge     HPI:   Pt feeling well today, denies any issues overnight. Wants to go home today.      Review of Systems  Gen- No fevers, chills  CV- No chest pain, palpitations  Resp- No cough, dyspnea  GI- No N/V/D, abd pain      Vital Signs:   Temp:  [98.2 °F (36.8 °C)-98.7 °F (37.1 °C)] 98.7 °F (37.1 °C)  Heart Rate:  [68-99] 77  Resp:  [18-20] 20  BP: (113-141)/(65-83) 130/74     Physical Exam:  Constitutional: No acute distress, awake, alert  HENT: NCAT, mucous membranes moist  Respiratory: Clear to auscultation bilaterally, respiratory effort normal   Cardiovascular: RRR, no murmurs, rubs, or gallops  Gastrointestinal: Positive bowel sounds, soft, nontender,  nondistended  Musculoskeletal: No bilateral ankle edema  Psychiatric: Appropriate affect, cooperative  Neurologic: Oriented x 3, strength symmetric in all extremities, Cranial Nerves grossly intact to confrontation, speech clear  Skin: No rashes    Pertinent  and/or Most Recent Results     LAB RESULTS:      Lab 12/10/21  0659 12/09/21  0931 12/08/21  1627 12/08/21  1522 12/08/21  1318   WBC 8.05 10.65  --   --  15.87*   HEMOGLOBIN 13.6 12.7*  --   --  14.6   HEMATOCRIT 41.1 37.8  --   --  41.7   PLATELETS 89* 101*  --   --  144   NEUTROS ABS 4.81 8.16*  --   --  14.55*   IMMATURE GRANS (ABS) 0.02 0.04  --   --  0.06*   LYMPHS ABS 1.86 1.36  --   --  0.60*   MONOS ABS 0.77 0.71  --   --  0.52   EOS ABS 0.56* 0.34  --   --  0.09   MCV 86.2 85.3  --   --  81.4   SED RATE  --  14  --   --   --    CRP  --   --   --  0.73*  --    PROCALCITONIN  --   --   --   --  0.43*   LACTATE  --   --  1.7  --  2.7*         Lab 12/10/21  0659 12/09/21  0717 12/08/21  1318   SODIUM 138 136 132*   POTASSIUM 4.0 4.0 4.3   CHLORIDE 104 105 97*   CO2 27.0 26.0 24.0   ANION GAP 7.0 5.0 11.0   BUN 11 15 19   CREATININE 0.96 1.03 1.09   GLUCOSE 123* 145* 177*   CALCIUM 8.8 7.8* 9.3   HEMOGLOBIN A1C  --   --  6.30*         Lab 12/10/21  0659 12/09/21  0717 12/08/21  1318   TOTAL PROTEIN 6.8 6.0 7.8   ALBUMIN 3.60 3.40* 4.40   GLOBULIN 3.2 2.6 3.4   ALT (SGPT) 15 12 17   AST (SGOT) 16 14 21   BILIRUBIN 1.6* 2.5* 2.3*   ALK PHOS 73 67 84   LIPASE  --   --  42         Lab 12/08/21  1522 12/08/21  1318   PROBNP  --  31.7   TROPONIN T <0.010 <0.010         Lab 12/08/21  1522   CHOLESTEROL 123   LDL CHOL 53   HDL CHOL 49   TRIGLYCERIDES 115         Lab 12/10/21  0659   FERRITIN 281.30         Brief Urine Lab Results  (Last result in the past 365 days)      Color   Clarity   Blood   Leuk Est   Nitrite   Protein   CREAT   Urine HCG        12/08/21 1522 Yellow   Clear   Negative   Negative   Negative   Negative               Microbiology Results (last  10 days)     Procedure Component Value - Date/Time    Gastrointestinal Panel, PCR - Stool, Per Rectum [[id number]]  (Normal) Collected: 12/09/21 1924    Lab Status: Final result Specimen: Stool from Per Rectum Updated: 12/09/21 2059     Campylobacter Not Detected     Plesiomonas shigelloides Not Detected     Salmonella Not Detected     Vibrio Not Detected     Vibrio cholerae Not Detected     Yersinia enterocolitica Not Detected     Enteroaggregative E. coli (EAEC) Not Detected     Enteropathogenic E. coli (EPEC) Not Detected     Enterotoxigenic E. coli (ETEC) lt/st Not Detected     Shiga-like toxin-producing E. coli (STEC) stx1/stx2 Not Detected     Shigella/Enteroinvasive E. coli (EIEC) Not Detected     Cryptosporidium Not Detected     Cyclospora cayetanensis Not Detected     Entamoeba histolytica Not Detected     Giardia lamblia Not Detected     Adenovirus F40/41 Not Detected     Astrovirus Not Detected     Norovirus GI/GII Not Detected     Rotavirus A Not Detected     Sapovirus (I, II, IV or V) Not Detected    Respiratory Culture - Sputum, Cough [[id number]] Collected: 12/09/21 1159    Lab Status: Preliminary result Specimen: Sputum from Cough Updated: 12/10/21 1018     Respiratory Culture Moderate growth (3+) The culture consists of normal respiratory latisha. This is a preliminary report; final report to follow.     Gram Stain Many (4+) Epithelial cells per low power field      Moderate (3+) WBCs per low power field      Many (4+) Mixed bacterial morphotypes seen on Gram Stain    Respiratory Panel, PCR (WITHOUT COVID) - Swab, Nasopharynx [[id number]]  (Normal) Collected: 12/09/21 1159    Lab Status: Final result Specimen: Swab from Nasopharynx Updated: 12/09/21 1319     ADENOVIRUS, PCR Not Detected     Coronavirus 229E Not Detected     Coronavirus HKU1 Not Detected     Coronavirus NL63 Not Detected     Coronavirus OC43 Not Detected     Human Metapneumovirus Not Detected     Human Rhinovirus/Enterovirus Not Detected      Influenza B PCR Not Detected     Parainfluenza Virus 1 Not Detected     Parainfluenza Virus 2 Not Detected     Parainfluenza Virus 3 Not Detected     Parainfluenza Virus 4 Not Detected     Bordetella pertussis pcr Not Detected     Chlamydophila pneumoniae PCR Not Detected     Mycoplasma pneumo by PCR Not Detected     Influenza A PCR Not Detected     RSV, PCR Not Detected     Bordetella parapertussis PCR Not Detected    Narrative:      The coronavirus on the RVP is NOT COVID-19 and is NOT indicative of infection with COVID-19.    In the setting of a positive respiratory panel with a viral infection PLUS a negative procalcitonin without other underlying concern for bacterial infection, consider observing off antibiotics or discontinuation of antibiotics and continue supportive care. If the respiratory panel is positive for atypical bacterial infection (Bordetella pertussis, Chlamydophila pneumoniae, or Mycoplasma pneumoniae), consider antibiotic de-escalation to target atypical bacterial infection.    Blood Culture - Blood, Arm, Right [[id number]]  (Normal) Collected: 12/08/21 1422    Lab Status: Preliminary result Specimen: Blood from Arm, Right Updated: 12/09/21 1445     Blood Culture No growth at 24 hours    Blood Culture - Blood, Arm, Right [[id number]]  (Normal) Collected: 12/08/21 1410    Lab Status: Preliminary result Specimen: Blood from Arm, Right Updated: 12/09/21 1445     Blood Culture No growth at 24 hours    COVID PRE-OP / PRE-PROCEDURE SCREENING ORDER (NO ISOLATION) - Swab, Nasopharynx [[id number]]  (Normal) Collected: 12/08/21 1337    Lab Status: Final result Specimen: Swab from Nasopharynx Updated: 12/08/21 1409    Narrative:      The following orders were created for panel order COVID PRE-OP / PRE-PROCEDURE SCREENING ORDER (NO ISOLATION) - Swab, Nasopharynx.  Procedure                               Abnormality         Status                     ---------                               -----------          ------                     COVID-19 and FLU A/B PCR...[[id number]]  Normal              Final result                 Please view results for these tests on the individual orders.    COVID-19 and FLU A/B PCR - Swab, Nasopharynx [[id number]]  (Normal) Collected: 12/08/21 1337    Lab Status: Final result Specimen: Swab from Nasopharynx Updated: 12/08/21 1409     COVID19 Not Detected     Influenza A PCR Not Detected     Influenza B PCR Not Detected    Narrative:      Fact sheet for providers: https://www.fda.gov/media/216738/download    Fact sheet for patients: https://www.fda.gov/media/130305/download    Test performed by PCR.          Adult Transthoracic Echo Complete w/ Color, Spectral and Contrast if necessary per protocol    Result Date: 12/9/2021  · The left ventricular cavity is mildly dilated. · The right ventricular cavity is mildly dilated. · Left ventricular systolic function is normal · No significant valvular heart disease      CT Abdomen Pelvis Without Contrast    Result Date: 12/8/2021  EXAMINATION: CT ABDOMEN PELVIS WO CONTRAST-  INDICATION: Sepsis with no clear source and left flank pain; R50.9-Fever, unspecified; A41.9-Sepsis, unspecified organism; R07.2-Precordial pain  TECHNIQUE: Noncontrast CT of the abdomen and pelvis  COMPARISON: NONE  FINDINGS:  Peripheral reticulations at the lung bases which otherwise appear clear. Unremarkable appearance of the liver. There is a single small gallstone within the gallbladder with otherwise unremarkable appearance of the gallbladder and bile ducts. Unremarkable appearance of the spleen, pancreas, and adrenal glands. Excreted contrast within the bilateral renal collecting systems and bladder from recent contrast-enhanced CT somewhat limits evaluation for renal and ureteral stones; there is a questionable punctate nonobstructing stone at the right superior pole (series 2 image 33). There is no evidence of hydronephrosis; note is made of small bilateral extrarenal  pelves. No filling defects within the renal collecting systems or bladder. No evidence of perinephric fat stranding or fluid. Unremarkable appearance of the prostate and seminal vesicles. Colonic diverticulosis without diverticulitis. Normal appendix. Otherwise unremarkable appearance of the GI tract. Unremarkable noncontrast appearance of the vasculature. No bulky abdominopelvic adenopathy. No conspicuous intra-abdominal fat stranding. No pneumoperitoneum or free intra-abdominal fluid. The body wall soft tissues appear unremarkable. No acute osseous findings. Bilateral L5 pars defects with 7 mm anterolisthesis of L5 on S1.       No acute abdominopelvic findings. No findings to explain left flank pain. Nonobstructing renal stone in the right superior pole. Cholelithiasis without CT evidence of cholecystitis.   This report was finalized on 12/8/2021 7:39 PM by Roney Rodríguez MD.      US Gallbladder    Result Date: 12/9/2021  EXAMINATION: US GALLBLADDER-12/09/2021:  INDICATION: Rule out Cholecystitis; R50.9-Fever, unspecified; A41.9-Sepsis, unspecified organism; R07.2-Precordial pain; Z74.09-Other reduced mobility; Z78.9-Other specified health status.  Cholecystitis, right upper quadrant pain.  TECHNIQUE: Sonographic imaging was obtained of the right upper quadrant in both the sagittal and transverse planes.  COMPARISON: NONE.  FINDINGS:  The pancreas is homogeneous in appearance with no focal mass or abnormal fluid collection. The liver is homogeneous and normal in echotexture and echogenicity with no focal mass or intrahepatic biliary ductal dilatation. The gallbladder reveals shadowing stones with no wall thickening or biliary ductal dilatation. The common bile duct measures 3 mm. The right kidney is normal in size, configuration, and texture measuring in length from pole to pole 13.6 cm. No solid cortical mass or renal cortical cysts seen within the right kidney. No hydronephrosis or nephrolithiasis.       Gallstones without wall thickening. No biliary ductal dilatation.  D:  12/09/2021 E:  12/09/2021         XR Chest 1 View    Result Date: 12/8/2021  EXAMINATION: XR CHEST 1 VW-12/08/2021:  INDICATION: Chest Pain, triage protocol.  COMPARISON: NONE.  FINDINGS: Portable chest reveals cardiac and mediastinal silhouettes within normal limits. The lung fields are clear. No focal parenchymal opacification present.  No pleural effusion or pneumothorax. The bony structures are unremarkable.      No acute cardiopulmonary disease.  D:  12/08/2021 E:  12/08/2021       CT Chest Pulmonary Embolism    Result Date: 12/8/2021  EXAMINATION: CT CHEST PULMONARY EMBOLISM-12/08/2021:  INDICATION: PE suspected, high prob, shortness of breath.  TECHNIQUE: Multiple axial CT imaging was obtained of the chest following the administration of intravenous contrast.  The radiation dose reduction device was turned on for each scan per the ALARA (As Low as Reasonably Achievable) protocol.  COMPARISON: NONE.  FINDINGS: The thyroid is homogeneous. No mediastinal mass or adenopathy. The cardiac chambers are within normal limits. No pericardial effusion. No bulky hilar or axillary lymphadenopathy. No filling defect in the segmental or subsegmental arteries. Degenerative changes seen within the spine. The visualized upper abdomen reveals some chronic changes and scarring in the lung bases bilaterally. No superimposed infiltrate. No pulmonary mass or nodule. Degenerative changes seen within the spine.      No evidence of pulmonary embolism. No CT evidence of acute intrathoracic abnormality. No superimposed infectious process.  D:  12/08/2021 E:  12/08/2021                 Results for orders placed during the hospital encounter of 12/08/21    Adult Transthoracic Echo Complete w/ Color, Spectral and Contrast if necessary per protocol    Interpretation Summary  · The left ventricular cavity is mildly dilated.  · The right ventricular cavity is mildly  dilated.  · Left ventricular systolic function is normal  · No significant valvular heart disease      Plan for Follow-up of Pending Labs/Results:   Pending Labs     Order Current Status    Babesia Microti Antibody Panel In process    Ehrlichia Antibody Panel In process    Lyme Disease Antibodies, Total and IgM with Reflex to Line Blot In process    [place] Spotted Fever, IgM In process    [facility] Spotted Fever, IgG In process    Blood Culture - Blood, Arm, Right Preliminary result    Blood Culture - Blood, Arm, Right Preliminary result    Respiratory Culture - Sputum, Cough Preliminary result        Discharge Details        Discharge Medications      New Medications      Instructions Start Date   doxycycline 100 MG capsule  Commonly known as: MONODOX   100 mg, Oral, Every 12 Hours Scheduled         Continue These Medications      Instructions Start Date   albuterol sulfate  (90 Base) MCG/ACT inhaler  Commonly known as: PROVENTIL HFA;VENTOLIN HFA;PROAIR HFA   2 puffs, Inhalation, Every 4 Hours PRN      aspirin 81 MG chewable tablet   81 mg, Oral, Daily      clopidogrel 75 MG tablet  Commonly known as: PLAVIX   75 mg, Oral, Daily      HM ClearLax 17 GM/SCOOP powder  Generic drug: polyethylene glycol   No dose, route, or frequency recorded.      lisinopril 5 MG tablet  Commonly known as: PRINIVIL,ZESTRIL   No dose, route, or frequency recorded.      metoprolol tartrate 25 MG tablet  Commonly known as: LOPRESSOR   25 mg, Oral, 2 Times Daily      pantoprazole 40 MG EC tablet  Commonly known as: PROTONIX   No dose, route, or frequency recorded.      ranolazine 500 MG 12 hr tablet  Commonly known as: RANEXA   500 mg, Oral, 2 Times Daily      rosuvastatin 10 MG tablet  Commonly known as: CRESTOR   40 mg, Oral, Daily             No Known Allergies      Discharge Disposition:  Home or Self Care    Diet:  Hospital:  Diet Order   Procedures   • Diet Regular; Thin; Cardiac       Activity:      Restrictions or  Other Recommendations:         CODE STATUS:    Code Status and Medical Interventions:   Ordered at: 12/08/21 4048     Level Of Support Discussed With:    Patient     Code Status (Patient has no pulse and is not breathing):    CPR (Attempt to Resuscitate)     Medical Interventions (Patient has pulse or is breathing):    Full Support       No future appointments.    Additional Instructions for the Follow-ups that You Need to Schedule     Discharge Follow-up with PCP   As directed       Currently Documented PCP:    Leila Kolb APRN    PCP Phone Number:    [phone]     Follow Up Details: in one week with PCP                     Dulce Vasquez MD  12/10/21      Time Spent on Discharge:  I spent  35  minutes on this discharge activity which included: face-to-face encounter with the patient, reviewing the data in the system, coordination of the care with the nursing staff as well as consultants, documentation, and entering orders.

## 2021-12-11 LAB
BACTERIA SPEC RESP CULT: NORMAL
GRAM STN SPEC: NORMAL

## 2021-12-13 LAB
BABESIA IGG TITR SER IF: NORMAL {TITER}
BABESIA IGM TITR SER IF: NORMAL {TITER}
BACTERIA SPEC AEROBE CULT: NORMAL
BACTERIA SPEC AEROBE CULT: NORMAL
CMV DNA SERPL NAA+PROBE-ACNC: NEGATIVE IU/ML
CMV DNA SERPL NAA+PROBE-LOG IU: NORMAL {LOG_IU}/ML
H CAPSUL AG SER QL IA: <0.5
Lab: NORMAL
R RICKETTSI IGG SER QL IA: POSITIVE
R RICKETTSI IGG TITR SER IF: NORMAL {TITER}
T PALLIDUM AB SER QL IF: NON REACTIVE

## 2021-12-14 LAB
1,3 BETA GLUCAN SER-MCNC: <31 PG/ML
A PHAGOCYTOPH IGG TITR SER IF: NEGATIVE {TITER}
A PHAGOCYTOPH IGM TITR SER IF: NEGATIVE {TITER}
B HENSELAE IGG TITR SER IF: NEGATIVE TITER
B HENSELAE IGM TITR SER IF: NEGATIVE TITER
B QUINTANA IGG TITR SER IF: NEGATIVE TITER
B QUINTANA IGM TITR SER IF: NEGATIVE TITER
BRUCELLA IGG SER QL IA: NEGATIVE
BRUCELLA IGM SER QL IA: NEGATIVE
E CHAFFEENSIS IGG TITR SER IF: NEGATIVE {TITER}
E CHAFFEENSIS IGM TITR SER IF: NEGATIVE {TITER}
EBV DNA # SERPL NAA+PROBE: NEGATIVE COPIES/ML
EBV DNA SPEC NAA+PROBE-LOG#: NORMAL {LOG_COPIES}/ML
GALACTOMANNAN AG SPEC IA-ACNC: 0.05 INDEX (ref 0–0.49)

## 2021-12-15 LAB
MVISTA(R) BLASTOMYCES AG: NORMAL NG/ML
SPECIMEN SOURCE: NORMAL

## 2021-12-18 LAB
T WHIPPLEI BY PCR, SOURCE: NORMAL
TROPHERYMA WHIPPLEI PCR: NOT DETECTED

## 2021-12-22 LAB
C BURNET PH1 IGG SER QL IF: NEGATIVE
C BURNET PH1 IGM SER QL IF: NEGATIVE
C BURNET PH2 IGG SER QL IF: NEGATIVE
C BURNET PH2 IGM SER QL IF: NEGATIVE

## 2022-01-11 ENCOUNTER — TRANSCRIBE ORDERS (OUTPATIENT)
Dept: ADMINISTRATIVE | Facility: HOSPITAL | Age: 62
End: 2022-01-11

## 2022-01-11 DIAGNOSIS — R07.2 PRECORDIAL CHEST PAIN: Primary | ICD-10-CM

## 2022-01-12 ENCOUNTER — HOSPITAL ENCOUNTER (OUTPATIENT)
Dept: CARDIOLOGY | Facility: HOSPITAL | Age: 62
Discharge: HOME | End: 2022-01-12

## 2022-01-12 DIAGNOSIS — Z00.6 ENCOUNTER FOR EXAMINATION FOR NORMAL COMPARISON OR CONTROL IN CLINICAL RESEARCH PROGRAM: ICD-10-CM

## 2022-02-11 ENCOUNTER — HOSPITAL ENCOUNTER (OUTPATIENT)
Dept: CARDIOLOGY | Facility: HOSPITAL | Age: 62
Discharge: HOME | End: 2022-02-11
Admitting: INTERNAL MEDICINE

## 2022-02-11 VITALS
DIASTOLIC BLOOD PRESSURE: 80 MMHG | BODY MASS INDEX: 34.07 KG/M2 | HEIGHT: 70 IN | HEART RATE: 68 BPM | SYSTOLIC BLOOD PRESSURE: 118 MMHG | WEIGHT: 238 LBS

## 2022-02-11 DIAGNOSIS — R07.2 PRECORDIAL CHEST PAIN: ICD-10-CM

## 2022-02-11 PROCEDURE — 93017 CV STRESS TEST TRACING ONLY: CPT

## 2022-02-11 PROCEDURE — 78452 HT MUSCLE IMAGE SPECT MULT: CPT

## 2022-02-11 PROCEDURE — A9500 TC99M SESTAMIBI: HCPCS | Performed by: INTERNAL MEDICINE

## 2022-02-11 PROCEDURE — 0 TECHNETIUM SESTAMIBI: Performed by: INTERNAL MEDICINE

## 2022-02-11 RX ADMIN — TECHNETIUM TC 99M SESTAMIBI 1 DOSE: 1 INJECTION INTRAVENOUS at 12:40

## 2022-02-11 RX ADMIN — TECHNETIUM TC 99M SESTAMIBI 1 DOSE: 1 INJECTION INTRAVENOUS at 10:55

## 2022-02-12 LAB
BH CV REST NUCLEAR ISOTOPE DOSE: 9.5 MCI
BH CV STRESS BP STAGE 1: NORMAL
BH CV STRESS BP STAGE 2: NORMAL
BH CV STRESS DURATION MIN STAGE 1: 3
BH CV STRESS DURATION MIN STAGE 2: 1
BH CV STRESS DURATION SEC STAGE 1: 0
BH CV STRESS DURATION SEC STAGE 2: 51
BH CV STRESS GRADE STAGE 1: 10
BH CV STRESS GRADE STAGE 2: 12
BH CV STRESS HR STAGE 1: 139
BH CV STRESS HR STAGE 2: 151
BH CV STRESS METS STAGE 1: 5
BH CV STRESS METS STAGE 2: 7.5
BH CV STRESS NUCLEAR ISOTOPE DOSE: 32.8 MCI
BH CV STRESS O2 STAGE 1: 92
BH CV STRESS O2 STAGE 2: 90
BH CV STRESS PROTOCOL 1: NORMAL
BH CV STRESS RECOVERY BP: NORMAL MMHG
BH CV STRESS RECOVERY HR: 90 BPM
BH CV STRESS RECOVERY O2: 98 %
BH CV STRESS SPEED STAGE 1: 1.7
BH CV STRESS SPEED STAGE 2: 2.5
BH CV STRESS STAGE 1: 1
BH CV STRESS STAGE 2: 2
LV EF NUC BP: 71 %
MAXIMAL PREDICTED HEART RATE: 160 BPM
PERCENT MAX PREDICTED HR: 94.38 %
STRESS BASELINE BP: NORMAL MMHG
STRESS BASELINE HR: 68 BPM
STRESS O2 SAT REST: 96 %
STRESS PERCENT HR: 111 %
STRESS POST ESTIMATED WORKLOAD: 6.8 METS
STRESS POST EXERCISE DUR MIN: 4 MIN
STRESS POST EXERCISE DUR SEC: 51 SEC
STRESS POST O2 SAT PEAK: 90 %
STRESS POST PEAK BP: NORMAL MMHG
STRESS POST PEAK HR: 151 BPM
STRESS TARGET HR: 136 BPM

## 2022-09-13 ENCOUNTER — TELEPHONE (OUTPATIENT)
Dept: GASTROENTEROLOGY | Facility: CLINIC | Age: 62
End: 2022-09-13

## 2022-09-15 ENCOUNTER — PRIOR AUTHORIZATION (OUTPATIENT)
Dept: GASTROENTEROLOGY | Facility: CLINIC | Age: 62
End: 2022-09-15

## 2022-09-15 DIAGNOSIS — Z12.11 SCREENING FOR COLON CANCER: Primary | ICD-10-CM

## 2022-09-27 ENCOUNTER — OUTSIDE FACILITY SERVICE (OUTPATIENT)
Dept: GASTROENTEROLOGY | Facility: CLINIC | Age: 62
End: 2022-09-27

## 2022-09-27 PROCEDURE — 88305 TISSUE EXAM BY PATHOLOGIST: CPT | Performed by: INTERNAL MEDICINE

## 2022-09-27 PROCEDURE — 45385 COLONOSCOPY W/LESION REMOVAL: CPT | Performed by: INTERNAL MEDICINE

## 2022-09-28 ENCOUNTER — LAB REQUISITION (OUTPATIENT)
Dept: LAB | Facility: HOSPITAL | Age: 62
End: 2022-09-28

## 2022-09-28 DIAGNOSIS — Z12.11 ENCOUNTER FOR SCREENING FOR MALIGNANT NEOPLASM OF COLON: ICD-10-CM

## 2022-09-29 LAB
CYTO UR: NORMAL
LAB AP CASE REPORT: NORMAL
LAB AP CLINICAL INFORMATION: NORMAL
PATH REPORT.FINAL DX SPEC: NORMAL
PATH REPORT.GROSS SPEC: NORMAL

## 2022-09-30 ENCOUNTER — TELEPHONE (OUTPATIENT)
Dept: GASTROENTEROLOGY | Facility: CLINIC | Age: 62
End: 2022-09-30

## 2022-09-30 NOTE — TELEPHONE ENCOUNTER
----- Message from Roger Marley MD sent at 9/29/2022  4:16 PM EDT -----  Please let Kobi know the polyp was benign.  His history still makes necessary for follow-up colonoscopy in 5 years time

## 2023-10-03 ENCOUNTER — TELEPHONE (OUTPATIENT)
Dept: GASTROENTEROLOGY | Facility: CLINIC | Age: 63
End: 2023-10-03

## 2023-10-03 NOTE — TELEPHONE ENCOUNTER
Hub staff attempted to follow warm transfer process and was unsuccessful     Caller: MEDARDO WITH [facility]    Relationship to patient: OTHER    Best call back number: [phone]     Patient is needing: MEDARDO WITH [facility] HAS CALLED TO MAKE APPT WITH OUR FACILITY FOR MR. JONES. MR. JONES WAS SEEN IN 2022 BY DR PIÑA FOR PROCEDURE. HE HAS ELEVATED BILIRUBIN AND HISTORY OF LIVER DISEASE & LABS. WOULD LIKE TO SCHEDULE APPT FOR THIS PATIENT. OFFICE HOURS 8-5 M-F.     [facility] UNSURE IF THIS PATIENT SHOULD BE SCHEDULED WITH DR PIÑA OR WITH OTHER PROVIDER. PER REFERENCE TOOL DR PIÑA DOES NOT SEE FOR ABOVE DIAGNOSIS. PLEASE REVIEW AND ADVISE.

## 2025-01-27 ENCOUNTER — TELEPHONE (OUTPATIENT)
Dept: NEUROLOGY | Facility: CLINIC | Age: 65
End: 2025-01-27
Payer: COMMERCIAL

## 2025-01-27 NOTE — TELEPHONE ENCOUNTER
LVM letting patient know that Charo will be out of the office again tomorrow and to return call to get r/s'd.      HUB:  please transfer to JIMBO [email or web address]